# Patient Record
Sex: FEMALE | Race: BLACK OR AFRICAN AMERICAN | Employment: OTHER | URBAN - METROPOLITAN AREA
[De-identification: names, ages, dates, MRNs, and addresses within clinical notes are randomized per-mention and may not be internally consistent; named-entity substitution may affect disease eponyms.]

---

## 2018-12-11 ENCOUNTER — ED HISTORICAL/CONVERTED ENCOUNTER (OUTPATIENT)
Dept: OTHER | Age: 23
End: 2018-12-11

## 2018-12-31 ENCOUNTER — OFFICE VISIT (OUTPATIENT)
Dept: NEUROLOGY | Age: 23
End: 2018-12-31

## 2018-12-31 VITALS
WEIGHT: 101 LBS | DIASTOLIC BLOOD PRESSURE: 70 MMHG | HEART RATE: 85 BPM | SYSTOLIC BLOOD PRESSURE: 100 MMHG | OXYGEN SATURATION: 98 %

## 2018-12-31 DIAGNOSIS — M54.81 BILATERAL OCCIPITAL NEURALGIA: ICD-10-CM

## 2018-12-31 DIAGNOSIS — G43.709 CHRONIC MIGRAINE WITHOUT AURA WITHOUT STATUS MIGRAINOSUS, NOT INTRACTABLE: Primary | ICD-10-CM

## 2018-12-31 RX ORDER — TOPIRAMATE 50 MG/1
50 TABLET, FILM COATED ORAL 2 TIMES DAILY
Qty: 60 TAB | Refills: 1 | Status: SHIPPED | OUTPATIENT
Start: 2018-12-31 | End: 2019-04-11 | Stop reason: SDUPTHER

## 2018-12-31 RX ORDER — TOPIRAMATE 25 MG/1
TABLET ORAL 2 TIMES DAILY
COMMUNITY
End: 2018-12-31 | Stop reason: SDUPTHER

## 2018-12-31 RX ORDER — SUMATRIPTAN 50 MG/1
50 TABLET, FILM COATED ORAL
Qty: 9 TAB | Refills: 0 | Status: SHIPPED | OUTPATIENT
Start: 2018-12-31 | End: 2018-12-31

## 2018-12-31 RX ORDER — ALBUTEROL SULFATE 0.83 MG/ML
SOLUTION RESPIRATORY (INHALATION)
COMMUNITY

## 2018-12-31 RX ORDER — ACETAMINOPHEN 500 MG
TABLET ORAL
Refills: 0 | COMMUNITY
Start: 2018-12-11

## 2018-12-31 NOTE — PATIENT INSTRUCTIONS
Office Policies 
o Phone calls/patient messages: Please allow up to 24 hours for someone in the office to contact you about your call or message. Be mindful your provider may be out of the office or your message may require further review. We encourage you to use shoply for your messages as this is a faster, more efficient way to communicate with our officeo Medication Refills: 
Prescription medications require up to 48 business hours to process. We encourage you to use shoply for your refills. For controlled medications: Please allow up to 72 business hours to process. Certain medications may require you to  a written prescription at our office. NO narcotic/controlled medications will be prescribed after 4pm Monday through Friday or on weekendso Form/Paperwork Compl We ask that you allow 7-14 business days. You may also download your forms to shoply to have your doctor print off.o Test Results: In order for a patient to obtain test results, an appointment must be made with the physician to review the results. Test results cannot be discussed over the phone. A Healthy Lifestyle: Care Instructions Your Care Instructions A healthy lifestyle can help you feel good, stay at a healthy weight, and have plenty of energy for both work and play. A healthy lifestyle is something you can share with your whole family. A healthy lifestyle also can lower your risk for serious health problems, such as high blood pressure, heart disease, and diabetes. You can follow a few steps listed below to improve your health and the health of your family. Follow-up care is a key part of your treatment and safety. Be sure to make and go to all appointments, and call your doctor if you are having problems. It's also a good idea to know your test results and keep a list of the medicines you take. How can you care for yourself at home? · Do not eat too much sugar, fat, or fast foods.  You can still have dessert and treats now and then. The goal is moderation. · Start small to improve your eating habits. Pay attention to portion sizes, drink less juice and soda pop, and eat more fruits and vegetables. ? Eat a healthy amount of food. A 3-ounce serving of meat, for example, is about the size of a deck of cards. Fill the rest of your plate with vegetables and whole grains. ? Limit the amount of soda and sports drinks you have every day. Drink more water when you are thirsty. ? Eat at least 5 servings of fruits and vegetables every day. It may seem like a lot, but it is not hard to reach this goal. A serving or helping is 1 piece of fruit, 1 cup of vegetables, or 2 cups of leafy, raw vegetables. Have an apple or some carrot sticks as an afternoon snack instead of a candy bar. Try to have fruits and/or vegetables at every meal. 
· Make exercise part of your daily routine. You may want to start with simple activities, such as walking, bicycling, or slow swimming. Try to be active 30 to 60 minutes every day. You do not need to do all 30 to 60 minutes all at once. For example, you can exercise 3 times a day for 10 or 20 minutes. Moderate exercise is safe for most people, but it is always a good idea to talk to your doctor before starting an exercise program. 
· Keep moving. [place]rence [place] the lawn, work in the garden, or [place]. Take the stairs instead of the elevator at work. · If you smoke, quit. People who smoke have an increased risk for heart attack, stroke, cancer, and other lung illnesses. Quitting is hard, but there are ways to boost your chance of quitting tobacco for good. ? Use nicotine gum, patches, or lozenges. ? Ask your doctor about stop-smoking programs and medicines. ? Keep trying.  
In addition to reducing your risk of diseases in the future, you will notice some benefits soon after you stop using tobacco. If you have shortness of breath or asthma symptoms, they will likely get better within a few weeks after you quit. · Limit how much alcohol you drink. Moderate amounts of alcohol (up to 2 drinks a day for men, 1 drink a day for women) are okay. But drinking too much can lead to liver problems, high blood pressure, and other health problems. Family health If you have a family, there are many things you can do together to improve your health. · Eat meals together as a family as often as possible. · Eat healthy foods. This includes fruits, vegetables, lean meats and dairy, and whole grains. · Include your family in your fitness plan. Most people think of activities such as jogging or tennis as the way to fitness, but there are many ways you and your family can be more active. Anything that makes you breathe hard and gets your heart pumping is exercise. Here are some tips: 
? Walk to do errands or to take your child to school or the bus. 
? Go for a family bike ride after dinner instead of watching TV. Where can you learn more? Go to http://shirley-crystal.info/. Enter C967 in the search box to learn more about \"A Healthy Lifestyle: Care Instructions. \" Current as of: December 7, 2017 Content Version: 11.8 © 2982-2564 [employer]. Care instructions adapted under license by [employer] (which disclaims liability or warranty for this information). If you have questions about a medical condition or this instruction, always ask your healthcare professional. [place] [address] any warranty or liability for your use of this information. Neck Pain: Care Instructions Your Care Instructions You can have neck pain anywhere from the bottom of your head to the top of your shoulders. It can spread to the upper back or arms. Injuries, painting a ceiling, sleeping with your neck twisted, staying in one position for too long, and many other activities can cause neck pain. Most neck pain gets better with home care.  Your doctor may recommend medicine to relieve pain or relax your muscles. He or she may suggest exercise and physical therapy to increase flexibility and relieve stress. You may need to wear a special (cervical) collar to support your neck for a day or two. Follow-up care is a key part of your treatment and safety. Be sure to make and go to all appointments, and call your doctor if you are having problems. It's also a good idea to know your test results and keep a list of the medicines you take. How can you care for yourself at home? · Try using a heating pad on a low or medium setting for 15 to 20 minutes every 2 or 3 hours. Try a warm shower in place of one session with the heating pad. · You can also try an ice pack for 10 to 15 minutes every 2 to 3 hours. Put a thin cloth between the ice and your skin. · Take pain medicines exactly as directed. ? If the doctor gave you a prescription medicine for pain, take it as prescribed. ? If you are not taking a prescription pain medicine, ask your doctor if you can take an over-the-counter medicine. · If your doctor recommends a cervical collar, wear it exactly as directed. When should you call for help? Call your doctor now or seek immediate medical care if: 
  · You have new or worsening numbness in your arms, buttocks or legs.  
  · You have new or worsening weakness in your arms or legs. (This could make it hard to stand up.)  
  · You lose control of your bladder or bowels.  
 Watch closely for changes in your health, and be sure to contact your doctor if: 
  · Your neck pain is getting worse.  
  · You are not getting better after 1 week.  
  · You do not get better as expected. Where can you learn more? Go to http://shirley-crystal.info/. Enter [id number] in the search box to learn more about \"Neck Pain: Care Instructions. \" Current as of: November 29, 2017 Content Version: 11.8 © 4705-0695 [employer].  Care instructions adapted under license by [address] (which disclaims liability or warranty for this information). If you have questions about a medical condition or this instruction, always ask your healthcare professional. [address] any warranty or liability for your use of this information. Migraine Headache: Care Instructions Your Care Instructions Migraines are painful, throbbing headaches that often start on one side of the head. They may cause nausea and vomiting and make you sensitive to light, sound, or smell. Without treatment, migraines can last from 4 hours to a few days. Medicines can help prevent migraines or stop them after they have started. Your doctor can help you find which ones work best for you. Follow-up care is a key part of your treatment and safety. Be sure to make and go to all appointments, and call your doctor if you are having problems. It's also a good idea to know your test results and keep a list of the medicines you take. How can you care for yourself at home? · Do not drive if you have taken a prescription pain medicine. · Rest in a quiet, dark room until your headache is gone. Close your eyes, and try to relax or go to sleep. Don't watch TV or read. · Put a cold, moist cloth or cold pack on the painful area for 10 to 20 minutes at a time. Put a thin cloth between the cold pack and your skin. · Use a warm, moist towel or a heating pad set on low to relax tight shoulder and neck muscles. · Have someone gently massage your neck and shoulders. · Take your medicines exactly as prescribed. Call your doctor if you think you are having a problem with your medicine. You will get more details on the specific medicines your doctor prescribes. · Be careful not to take pain medicine more often than the instructions allow. You could get worse or more frequent headaches when the medicine wears off. To prevent migraines · Keep a headache diary so you can figure out what triggers your headaches. Avoiding triggers may help you prevent headaches. Record when each headache began, how long it lasted, and what the pain was like. (Was it throbbing, aching, stabbing, or dull?) Write down any other symptoms you had with the headache, such as nausea, flashing lights or dark spots, or sensitivity to bright light or loud noise. Note if the headache occurred near your period. List anything that might have triggered the headache. Triggers may include certain foods (chocolate, cheese, wine) or odors, smoke, bright light, stress, or lack of sleep. · If your doctor has prescribed medicine for your migraines, take it as directed. You may have medicine that you take only when you get a migraine and medicine that you take all the time to help prevent migraines. ? If your doctor has prescribed medicine for when you get a headache, take it at the first sign of a migraine, unless your doctor has given you other instructions. ? If your doctor has prescribed medicine to prevent migraines, take it exactly as prescribed. Call your doctor if you think you are having a problem with your medicine. · Find healthy ways to deal with stress. Migraines are most common during or right after stressful times. Take time to relax before and after you do something that has caused a migraine in the past. 
· Try to keep your muscles relaxed by keeping good posture. Check your jaw, face, neck, and shoulder muscles for tension. Try to relax them. When you sit at a desk, change positions often. And make sure to stretch for 30 seconds each hour. · Get plenty of sleep and exercise. · Eat meals on a regular schedule. Avoid foods and drinks that often trigger migraines.  These include chocolate, alcohol (especially red wine and port), aspartame, monosodium glutamate (MSG), and some additives found in foods (such as hot dogs, sanchez, cold cuts, aged cheeses, and pickled foods). · Limit caffeine. Don't drink too much coffee, tea, or soda. But don't quit caffeine suddenly. That can also give you migraines. · Do not smoke or allow others to smoke around you. If you need help quitting, talk to your doctor about stop-smoking programs and medicines. These can increase your chances of quitting for good. · If you are taking birth control pills or hormone therapy, talk to your doctor about whether they are triggering your migraines. When should you call for help? Call 911 anytime you think you may need emergency care. For example, call if: 
  · You have signs of a stroke. These may include: 
? Sudden numbness, paralysis, or weakness in your face, arm, or leg, especially on only one side of your body. ? Sudden vision changes. ? Sudden trouble speaking. ? Sudden confusion or trouble understanding simple statements. ? Sudden problems with walking or balance. ? A sudden, severe headache that is different from past headaches.  
 Call your doctor now or seek immediate medical care if: 
  · You have new or worse nausea and vomiting.  
  · You have a new or higher fever.  
  · Your headache gets much worse.  
 Watch closely for changes in your health, and be sure to contact your doctor if: 
  · You are not getting better after 2 days (48 hours). Where can you learn more? Go to http://shirley-crystal.info/. Enter Q523 in the search box to learn more about \"Migraine Headache: Care Instructions. \" Current as of: June 4, 2018 Content Version: 11.8 © 8066-6935 Healthwise, Incorporated. Care instructions adapted under license by [employer] (which disclaims liability or warranty for this information). If you have questions about a medical condition or this instruction, always ask your healthcare professional. [address] any warranty or liability for your use of this information.

## 2018-12-31 NOTE — PROGRESS NOTES
NEUROLOGY CLINIC NOTE Patient ID: 
Kizzy Tran [id number] 
65 y.o. 
1995 Date of Consultation:  December 31, 2018 Referring Physician: Kenton Porter NP Reason for Consultation:  headaches Chief Complaint Patient presents with  
 Headache History of Present Illness:  
Patient Active Problem List  
Diagnosis Code  Migraine G43.909 Past Medical History:  
Diagnosis Date  Asthma  FHx: mental illness  Headache  Mental health problem History reviewed. No pertinent surgical history. Prior to Admission medications Medication Sig Start Date End Date Taking? Authorizing Provider  
topiramate (TOPAMAX) 25 mg tablet Take  by mouth two (2) times a day. Yes Provider, Historical  
 
NKDA Social History Tobacco Use  Smoking status: Current Some Day Smoker Packs/day: 1.00  Smokeless tobacco: Never Used Substance Use Topics  Alcohol use: No  
  Frequency: Never  Drug use: No  
 
Family History Problem Relation Age of Onset  Cancer Paternal Uncle  Cancer Maternal Grandmother Subjective:  
  
Kizzy Tran is a 24 y.o. RHAAF with history of migraine headaches, anxiety/depression and asthma who was referred here by Kenton Porter NP for further evaluation of her headaches. Headache since 2014 after pregnancy. Excedrin extra strength and Topiramate (mothers medication) as needed provided relief. Headaches became worse after new birth control (implanon) in 2016 and after starting Mirtazapine for anxiety/depression. Gradual onset. Starts bilateral occiput then radiates to the temples and in between the eyes. 3/10 to >10/10. Pressure and stabbing/tingling pain. Associated with light sensitivity and dizziness. Last 30 minutes to 3 days. Occurs 3 times a week. Worst headaches occur 3 times a month. Last was two weeks PTC (took off work). No known precipitant. Worse with lights. Rest in darkroom helps. Two of Topiramate and Excedrin helps after 30 minutes to manageable level. Currently c/o 3/10 headache. 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 Uses a chair with headrest and wireless headset. Outside reports reviewed: none Review of Systems: A comprehensive review of systems was performed:  
Constitutional: positive for poor appetite Eyes: positive for none Ears, nose, mouth, throat, and face: positive for none Respiratory: positive for none Cardiovascular: positive for none Gastrointestinal: positive for none Genitourinary: positive for none Integument/breast: positive for none Hematologic/lymphatic: positive for none Musculoskeletal: positive for none Neurological: positive for headaches Behavioral/Psych: positive for anxiety, depression Endocrine: positive for none Allergic/Immunologic: positive for none Objective:  
 
Visit Vitals /70 Pulse 85 Wt 101 lb (45.8 kg) SpO2 98% 3/10 headache PHYSICAL EXAM: 
 
General Appearance: Alert, patient appears stated age. General:  Thin,, well nourished,  patient in no apparent distress. Head/Face: The head is normocephalic and atraumatic. Eyes: Conjunctivae appear normal. Sclera appear normal and non-icteric. Nose (and Sinus):   No abnormality of the nose or sinuses is noted. Oral:   Throat is clear. Lymphatics:  No lymphadenopathy in the neck/head. Neck and Thyroid:   No bruits noted in the neck. Respiratory:  Lungs clear to auscultation. Cardiovascular:  Palpation and auscultation: regular rate and rhythm. Extremity: No joint swelling, erythema or pedal edema. NEUROLOGICAL EXAM: 
 
Appearance: The patient is thin, well nourished, provides a coherent history and is in no acute distress. Mental Status: Oriented to time, place and person. Fluent, no aphasia or dysarthria. Mood and affect appropriate. Cranial Nerves:   Intact visual fields. VA 20/25 OD and 20/20 OS on near vision with correction. Fundi are benign. SCOTT, EOM's full, no nystagmus, no ptosis. Facial sensation is normal. Corneal reflexes are intact. Facial movement is symmetric. Hearing is normal bilaterally. Palate is midline with normal elevation. Sternocleidomastoid and trapezius muscles are normal. Tongue is midline. Motor:  5/5 strength in upper and lower proximal and distal muscles. Normal bulk and tone. No pronator drift. Reflexes:   Deep tendon reflexes 2+/4 and symmetrical. Downgoing toes. Sensory:   Normal to cold, pinprick and vibration. Gait:  Normal gait. No Romberg. Can do tandem walking. Tremor:   No tremor noted. Cerebellar:  Intact FTN/JACKELYN/HTS. Neurovascular:  Normal heart sounds and regular rhythm, peripheral pulses intact, and no carotid bruits. Cervical range of motion measurement: 
     Degrees Head flexion   85 Head extension  35 Right lateral head flexion 37 Left lateral head flexion 47 Right head rotation  85 Left head rotation  85 Moderate restriction head extension and mild restriction right lateral head flexion Severe anterior head posture (3-4 FB off shoulder) with shoulders rotated in 
(+) tenderness on palpation bilateral occiput, neck and shoulder muscles Assessment:  
Chronic migraine without aura - worsening Bilateral occipital neuralgia -worsening Plan:  
Neurological examination is nonfocal.  No indication currently to do any neuroimaging. Worse headaches is consistent with migraines. Frequency of headaches warrants maintenance therapy. Increase dose of topiramate up to 50 mg twice daily if necessary. Prescription was provided. Trial of sumatriptan 50 mg as needed for abortive therapy. Prescription was provided. Advised to refrain from things that could trigger a headache (dairy, chocolate, wine and etc.). Exam reveals significant elements of bilateral occipital neuralgia causing cervicogenic headaches secondary to poor head posture. Restrictions to head extension and right lateral flexion confirmed by range of motion measurements. Advised to correct her anterior head posture. Use headrest at work, at home and while driving. Use wireless headset. Do not carry anything on the shoulder. Only one pillow when sleeping. Given brochure for neck and shoulder exercises to do. If not successful then I will send her to physical therapy. Potential future trial of muscle relaxants anti-spasticity medications. Okay to take OTC medication for abortive therapy. Potential future occipital nerve blocks if condition worsens. All questions and concerns were answered.

## 2019-01-03 PROBLEM — G43.909 MIGRAINE: Chronic | Status: ACTIVE | Noted: 2019-01-03

## 2019-04-11 ENCOUNTER — OFFICE VISIT (OUTPATIENT)
Dept: NEUROLOGY | Age: 24
End: 2019-04-11

## 2019-04-11 VITALS
HEIGHT: 66 IN | WEIGHT: 97.6 LBS | SYSTOLIC BLOOD PRESSURE: 110 MMHG | OXYGEN SATURATION: 98 % | HEART RATE: 70 BPM | BODY MASS INDEX: 15.69 KG/M2 | DIASTOLIC BLOOD PRESSURE: 58 MMHG

## 2019-04-11 DIAGNOSIS — M54.81 BILATERAL OCCIPITAL NEURALGIA: ICD-10-CM

## 2019-04-11 DIAGNOSIS — G43.709 CHRONIC MIGRAINE WITHOUT AURA WITHOUT STATUS MIGRAINOSUS, NOT INTRACTABLE: Primary | ICD-10-CM

## 2019-04-11 RX ORDER — IBUPROFEN 200 MG
TABLET ORAL
COMMUNITY
End: 2021-01-27

## 2019-04-11 RX ORDER — TOPIRAMATE 50 MG/1
50 TABLET, FILM COATED ORAL 2 TIMES DAILY
Qty: 60 TAB | Refills: 2 | Status: SHIPPED | OUTPATIENT
Start: 2019-04-11 | End: 2021-01-27

## 2019-04-11 RX ORDER — SUMATRIPTAN 100 MG/1
100 TABLET, FILM COATED ORAL
Qty: 9 TAB | Refills: 3 | Status: SHIPPED | OUTPATIENT
Start: 2019-04-11 | End: 2019-04-11

## 2019-04-11 RX ORDER — SUMATRIPTAN 50 MG/1
50 TABLET, FILM COATED ORAL
COMMUNITY
End: 2019-04-11 | Stop reason: DRUGHIGH

## 2019-04-11 NOTE — LETTER
4/11/19 Patient: Anuradha Jackson YOB: 1995 Date of Visit: 4/11/2019 Liz Sanz NP 
[facility] [address] [facility] [address] VIA Facsimile: [phone] Dear Liz Sanz NP, Thank you for referring Ms. Anuradha Jackson to [address] [place] [address] for evaluation. My notes for this consultation are attached. If you have questions, please do not hesitate to call me. I look forward to following your patient along with you. Sincerely, Asia Johnson MD

## 2019-04-11 NOTE — PROGRESS NOTES
NEUROLOGY CLINIC NOTE    Patient ID:  Miguelito Jack  <[id number]>  22 y.o.  1995    Date of Visit:  April 11, 2019    Referring Physician: Greta Chavez NP    Reason for Visit:  headaches  Chief Complaint   Patient presents with    Follow-up     migraine       History of Present Illness:   Patient Active Problem List   Diagnosis Code    Migraine G43.909     Past Medical History:   Diagnosis Date    Asthma     FHx: mental illness     Headache     Mental health problem      No past surgical history on file. Prior to Admission medications    Medication Sig Start Date End Date Taking? Authorizing Provider   SUMAtriptan (IMITREX) 50 mg tablet Take 50 mg by mouth once as needed for Migraine. Yes Provider, Historical   ibuprofen (ADVIL) 200 mg tablet Take  by mouth. Yes Provider, Historical   aspirin-acetaminophen-caffeine (EXCEDRIN ES) 250-250-65 mg per tablet Take 1 Tab by mouth. Yes Provider, Historical   acetaminophen (TYLENOL) 500 mg tablet take 1 tablet by mouth every 4 hours if needed for pain 12/11/18  Yes Provider, Historical   albuterol (PROVENTIL VENTOLIN) 2.5 mg /3 mL (0.083 %) nebulizer solution albuterol sulfate 2.5 mg/3 mL (0.083 %) solution for nebulization   Yes Provider, Historical   topiramate (TOPAMAX) 50 mg tablet Take 1 Tab by mouth two (2) times a day. Take 1/2 in am and 1 in pm x 1 wk; then 1 BID 12/31/18  Yes Kike Rowan MD     NKDA    Social History     Tobacco Use    Smoking status: Current Some Day Smoker     Packs/day: 1.00    Smokeless tobacco: Never Used   Substance Use Topics    Alcohol use: No     Frequency: Never    Drug use: No     Family History   Problem Relation Age of Onset    Cancer Paternal Uncle     Cancer Maternal Grandmother        Subjective:      Miguelito Jack is a 21 y.o. RHAAF with history of migraine headaches, anxiety/depression and asthma who was referred here by Greta Chavez NP for further evaluation of her headaches. Headache since 2014 after pregnancy. Excedrin extra strength and Topiramate (mothers medication) as needed provided relief. Headaches became worse after new birth control (implanon) in 2016 and after starting Mirtazapine for anxiety/depression. Gradual onset. Starts bilateral occiput then radiates to the temples and in between the eyes. 3/10 to >10/10. Pressure and stabbing/tingling pain. Associated with light sensitivity and dizziness. Last 30 minutes to 3 days. Occurs 3 times a week. Worst headaches occur 3 times a month. Last was two weeks PTC (took off work). No known precipitant. Worse with lights. Rest in darkroom helps. Two of Topiramate and Excedrin helps after 30 minutes to manageable level. Currently c/o 3/10 headache. 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 Uses a chair with headrest and wireless headset. Since the last visit on 12/31/2018, per patient she was doing very well from a headache standpoint until 3/19/2019. She was only having at most 1 migraine headache per week. Sumatriptan 50 mg resolves that headache after 30 minutes. She is taking Topiramate 50 mg BID. She even had a 2 week period without any headaches at all. Then last 3/19/2019 she started the night shift at work and her headache are worst in frequency. Her shift starts at 2:30 pm to midnight. Then she has problems starting sleep. She sleep around past 2 am and wakes up around 7:30 am to get her kids ready for school. Averages 5 1/2 hours of sleep. Wakes up not feeling rested. She saw her PCP recently and was prescribed Mirtazapine which she will start tonight. Headaches almost daily. Alternates between Advil, Excedrin migraine and Sumatriptan. Currently 9/10 headache. She is aware of her posture and tries to correct it. She is doing the neck and shoulder exercises.     Outside reports reviewed: none    Review of Systems:    A comprehensive review of systems was performed:   Positive for weight loss, chills, insomnia, night sweats, blurry vision, dry eyes, joint pain, myalgia, leg cramps, weakness, back pain, anxiety, depression, headaches      Objective:     Visit Vitals  /58   Pulse 70   Ht 5' 6\" (1.676 m)   Wt 97 lb 9.6 oz (44.3 kg)   SpO2 98%   BMI 15.75 kg/m²     9/10 headache    PHYSICAL EXAM:    NEUROLOGICAL EXAM:    Appearance: The patient is thin, well nourished, provides a coherent history and is in no acute distress. Mental Status: Oriented to time, place and person. Fluent, no aphasia or dysarthria. Mood and affect appropriate. Cranial Nerves:   II - XII were intact. Motor:  5/5 strength. Normal bulk and tone. No pronator drift. Reflexes:   Deep tendon reflexes 2+/4 and symmetrical. Downgoing toes. Sensory:   Normal to cold, pinprick and vibration. Gait:  Normal gait. No Romberg. Tremor:   No tremor noted. Cerebellar:  Intact FTN/JACKELYN/HTS. Severe anterior head posture (3-4 FB off shoulder) with shoulders rotated in  (+) tenderness on palpation bilateral occiput, neck and shoulder muscles      Assessment:   Chronic migraine without aura - worsening  Bilateral occipital neuralgia -worsening  Insomnia    Plan:   Neurological examination is unchanged. It is still nonfocal.  No indication currently to do any neuroimaging. Worse headaches is consistent with migraines. Continue Topiramate 50 mg twice daily. Prescription was provided. Increase Sumatriptan to 100 mg as needed for abortive therapy. Prescription was provided. Advised to refrain from things that could trigger a headache (dairy, chocolate, wine and etc.). Advised to obtain FMLA form. Exam reveals significant elements of bilateral occipital neuralgia causing cervicogenic headaches secondary to poor head posture. Encouraged to continue to correct her anterior head posture. Use headrest at work, at home and while driving. Use wireless headset.  Do not carry anything on the shoulder. Only one pillow when sleeping. Continue neck and shoulder exercises to do. If not successful then I will send her to physical therapy. Potential future trial of muscle relaxants anti-spasticity medications. Okay to take OTC medication for abortive therapy. Potential future occipital nerve blocks if condition worsens. Issue with starting sleep. Agree with trial of Mirtazapine. If no benefit, then I may do a trial of Amitriptyline. All questions and concerns were answered.

## 2019-04-11 NOTE — PATIENT INSTRUCTIONS
Migraine Headache: Care Instructions  Your Care Instructions  Migraines are painful, throbbing headaches that often start on one side of the head. They may cause nausea and vomiting and make you sensitive to light, sound, or smell. Without treatment, migraines can last from 4 hours to a few days. Medicines can help prevent migraines or stop them after they have started. Your doctor can help you find which ones work best for you. Follow-up care is a key part of your treatment and safety. Be sure to make and go to all appointments, and call your doctor if you are having problems. It's also a good idea to know your test results and keep a list of the medicines you take. How can you care for yourself at home? · Do not drive if you have taken a prescription pain medicine. · Rest in a quiet, dark room until your headache is gone. Close your eyes, and try to relax or go to sleep. Don't watch TV or read. · Put a cold, moist cloth or cold pack on the painful area for 10 to 20 minutes at a time. Put a thin cloth between the cold pack and your skin. · Use a warm, moist towel or a heating pad set on low to relax tight shoulder and neck muscles. · Have someone gently massage your neck and shoulders. · Take your medicines exactly as prescribed. Call your doctor if you think you are having a problem with your medicine. You will get more details on the specific medicines your doctor prescribes. · Be careful not to take pain medicine more often than the instructions allow. You could get worse or more frequent headaches when the medicine wears off. To prevent migraines  · Keep a headache diary so you can figure out what triggers your headaches. Avoiding triggers may help you prevent headaches. Record when each headache began, how long it lasted, and what the pain was like.  (Was it throbbing, aching, stabbing, or dull?) Write down any other symptoms you had with the headache, such as nausea, flashing lights or dark spots, or sensitivity to bright light or loud noise. Note if the headache occurred near your period. List anything that might have triggered the headache. Triggers may include certain foods (chocolate, cheese, wine) or odors, smoke, bright light, stress, or lack of sleep. · If your doctor has prescribed medicine for your migraines, take it as directed. You may have medicine that you take only when you get a migraine and medicine that you take all the time to help prevent migraines. ? If your doctor has prescribed medicine for when you get a headache, take it at the first sign of a migraine, unless your doctor has given you other instructions. ? If your doctor has prescribed medicine to prevent migraines, take it exactly as prescribed. Call your doctor if you think you are having a problem with your medicine. · Find healthy ways to deal with stress. Migraines are most common during or right after stressful times. Take time to relax before and after you do something that has caused a migraine in the past.  · Try to keep your muscles relaxed by keeping good posture. Check your jaw, face, neck, and shoulder muscles for tension. Try to relax them. When you sit at a desk, change positions often. And make sure to stretch for 30 seconds each hour. · Get plenty of sleep and exercise. · Eat meals on a regular schedule. Avoid foods and drinks that often trigger migraines. These include chocolate, alcohol (especially red wine and port), aspartame, monosodium glutamate (MSG), and some additives found in foods (such as hot dogs, sanchez, cold cuts, aged cheeses, and pickled foods). · Limit caffeine. Don't drink too much coffee, tea, or soda. But don't quit caffeine suddenly. That can also give you migraines. · Do not smoke or allow others to smoke around you. If you need help quitting, talk to your doctor about stop-smoking programs and medicines. These can increase your chances of quitting for good.   · If you are taking birth control pills or hormone therapy, talk to your doctor about whether they are triggering your migraines. When should you call for help? Call 911 anytime you think you may need emergency care. For example, call if:    · You have signs of a stroke. These may include:  ? Sudden numbness, paralysis, or weakness in your face, arm, or leg, especially on only one side of your body. ? Sudden vision changes. ? Sudden trouble speaking. ? Sudden confusion or trouble understanding simple statements. ? Sudden problems with walking or balance. ? A sudden, severe headache that is different from past headaches.    Call your doctor now or seek immediate medical care if:    · You have new or worse nausea and vomiting.     · You have a new or higher fever.     · Your headache gets much worse.    Watch closely for changes in your health, and be sure to contact your doctor if:    · You are not getting better after 2 days (48 hours). Where can you learn more? Go to http://shirley-crystal.info/. Enter Q282 in the search box to learn more about \"Migraine Headache: Care Instructions. \"  Current as of: Terrie 3, 2018  Content Version: 11.9  © 2600-2853 [employer]. Care instructions adapted under license by [employer] (which disclaims liability or warranty for this information). If you have questions about a medical condition or this instruction, always ask your healthcare professional. [address] any warranty or liability for your use of this information. Neck: Exercises  Your Care Instructions  Here are some examples of typical rehabilitation exercises for your condition. Start each exercise slowly. Ease off the exercise if you start to have pain. Your doctor or physical therapist will tell you when you can start these exercises and which ones will work best for you. How to do the exercises  Neck stretch    1.  This stretch works best if you keep your shoulder down as you lean away from it. To help you remember to do this, start by relaxing your shoulders and lightly holding on to your thighs or your chair. 2. Tilt your head toward your shoulder and hold for 15 to 30 seconds. Let the weight of your head stretch your muscles. 3. If you would like a little added stretch, use your hand to gently and steadily pull your head toward your shoulder. For example, keeping your right shoulder down, lean your head to the left. 4. Repeat 2 to 4 times toward each shoulder. Diagonal neck stretch    1. Turn your head slightly toward the direction you will be stretching, and tilt your head diagonally toward your chest and hold for 15 to 30 seconds. 2. If you would like a little added stretch, use your hand to gently and steadily pull your head forward on the diagonal.  3. Repeat 2 to 4 times toward each side. Dorsal glide stretch    1. Sit or stand tall and look straight ahead. 2. Slowly tuck your chin as you glide your head backward over your body  3. Hold for a count of 6, and then relax for up to 10 seconds. 4. Repeat 8 to 12 times. Chest and shoulder stretch    1. Sit or stand tall and glide your head backward as in the dorsal glide stretch. 2. Raise both arms so that your hands are next to your ears. 3. Take a deep breath, and as you breathe out, lower your elbows down and behind your back. You will feel your shoulder blades slide down and together, and at the same time you will feel a stretch across your chest and the front of your shoulders. 4. Hold for about 6 seconds, and then relax for up to 10 seconds. 5. Repeat 8 to 12 times. Strengthening: Hands on head    1. Move your head backward, forward, and side to side against gentle pressure from your hands, holding each position for about 6 seconds. 2. Repeat 8 to 12 times. Follow-up care is a key part of your treatment and safety.  Be sure to make and go to all appointments, and call your doctor if you are having problems. It's also a good idea to know your test results and keep a list of the medicines you take. Where can you learn more? Go to http://shirley-crystal.info/. Enter P975 in the search box to learn more about \"Neck: Exercises. \"  Current as of: September 20, 2018  Content Version: 11.9  © 2679-5246 [employer]. Care instructions adapted under license by [employer] (which disclaims liability or warranty for this information). If you have questions about a medical condition or this instruction, always ask your healthcare professional. [place] [address] any warranty or liability for your use of this information. Neck Pain: Care Instructions  Your Care Instructions    You can have neck pain anywhere from the bottom of your head to the top of your shoulders. It can spread to the upper back or arms. Injuries, painting a ceiling, sleeping with your neck twisted, staying in one position for too long, and many other activities can cause neck pain. Most neck pain gets better with home care. Your doctor may recommend medicine to relieve pain or relax your muscles. He or she may suggest exercise and physical therapy to increase flexibility and relieve stress. You may need to wear a special (cervical) collar to support your neck for a day or two. Follow-up care is a key part of your treatment and safety. Be sure to make and go to all appointments, and call your doctor if you are having problems. It's also a good idea to know your test results and keep a list of the medicines you take. How can you care for yourself at home? · Try using a heating pad on a low or medium setting for 15 to 20 minutes every 2 or 3 hours. Try a warm shower in place of one session with the heating pad. · You can also try an ice pack for 10 to 15 minutes every 2 to 3 hours. Put a thin cloth between the ice and your skin.   · Take pain medicines exactly as directed. ? If the doctor gave you a prescription medicine for pain, take it as prescribed. ? If you are not taking a prescription pain medicine, ask your doctor if you can take an over-the-counter medicine. · If your doctor recommends a cervical collar, wear it exactly as directed. When should you call for help? Call your doctor now or seek immediate medical care if:    · You have new or worsening numbness in your arms, buttocks or legs.     · You have new or worsening weakness in your arms or legs. (This could make it hard to stand up.)     · You lose control of your bladder or bowels.    Watch closely for changes in your health, and be sure to contact your doctor if:    · Your neck pain is getting worse.     · You are not getting better after 1 week.     · You do not get better as expected. Where can you learn more? Go to http://shirley-crystal.info/. Enter [id number] in the search box to learn more about \"Neck Pain: Care Instructions. \"  Current as of: September 20, 2018  Content Version: 11.9  © 7591-3359 [employer]. Care instructions adapted under license by [employer] (which disclaims liability or warranty for this information). If you have questions about a medical condition or this instruction, always ask your healthcare professional. [address] any warranty or liability for your use of this information.

## 2019-04-12 ENCOUNTER — TELEPHONE (OUTPATIENT)
Dept: NEUROLOGY | Age: 24
End: 2019-04-12

## 2019-04-12 NOTE — TELEPHONE ENCOUNTER
Patient faxed over her FMLA and ADA forms and would like to spk with the nurse regding them.  She would like to send them to her employer as soon as possible

## 2019-04-15 ENCOUNTER — TELEPHONE (OUTPATIENT)
Dept: NEUROLOGY | Age: 24
End: 2019-04-15

## 2019-04-15 NOTE — TELEPHONE ENCOUNTER
----- Message from Semaj Rodney sent at 4/15/2019  1:07 PM EDT -----  Regarding: Dr. Elena Pals: [phone]  Pt requesting a call back from [address]. Please fax document back to [phone] to pt's mom. Stated need the documents back within 8 days.

## 2019-04-15 NOTE — TELEPHONE ENCOUNTER
Left message for patient stating I had received the paper work and I would let her know once I had an update on that status

## 2019-04-15 NOTE — TELEPHONE ENCOUNTER
Spoke with patient and she stated that the packets just needed to be filled out as soon as possible because her job needs them back. She said to fax it to her mother and her mother will give them to her .

## 2019-04-18 ENCOUNTER — TELEPHONE (OUTPATIENT)
Dept: NEUROLOGY | Age: 24
End: 2019-04-18

## 2019-04-18 NOTE — TELEPHONE ENCOUNTER
----- Message from Eloina Milan sent at 4/18/2019 12:18 PM EDT -----  Regarding: Dr Chiqui Saucedo telephone  The pt is requesting a callback to find out if her FMLA and ADA forms have been filled out.         Best contact number is [phone]

## 2019-04-19 NOTE — TELEPHONE ENCOUNTER
Spoke with patient and let her know that I faxed her FMLA paperwork over to her mother at the number she provided. I also let her know that we would just hold onto the other packet if she needs.      She stated understanding

## 2019-06-11 ENCOUNTER — ED HISTORICAL/CONVERTED ENCOUNTER (OUTPATIENT)
Dept: OTHER | Age: 24
End: 2019-06-11

## 2019-06-12 ENCOUNTER — TELEPHONE (OUTPATIENT)
Dept: NEUROLOGY | Age: 24
End: 2019-06-12

## 2019-06-18 ENCOUNTER — ED HISTORICAL/CONVERTED ENCOUNTER (OUTPATIENT)
Dept: OTHER | Age: 24
End: 2019-06-18

## 2019-08-12 ENCOUNTER — ED HISTORICAL/CONVERTED ENCOUNTER (OUTPATIENT)
Dept: OTHER | Age: 24
End: 2019-08-12

## 2019-12-29 ENCOUNTER — ED HISTORICAL/CONVERTED ENCOUNTER (OUTPATIENT)
Dept: OTHER | Age: 24
End: 2019-12-29

## 2020-03-24 ENCOUNTER — ED HISTORICAL/CONVERTED ENCOUNTER (OUTPATIENT)
Dept: OTHER | Age: 25
End: 2020-03-24

## 2020-04-03 ENCOUNTER — TELEPHONE (OUTPATIENT)
Dept: NEUROLOGY | Age: 25
End: 2020-04-03

## 2021-01-27 ENCOUNTER — HOSPITAL ENCOUNTER (EMERGENCY)
Age: 26
Discharge: HOME | End: 2021-01-27
Attending: FAMILY MEDICINE
Payer: MEDICAID

## 2021-01-27 VITALS
RESPIRATION RATE: 16 BRPM | DIASTOLIC BLOOD PRESSURE: 67 MMHG | BODY MASS INDEX: 24.33 KG/M2 | SYSTOLIC BLOOD PRESSURE: 120 MMHG | TEMPERATURE: 98.2 F | HEIGHT: 67 IN | OXYGEN SATURATION: 100 % | HEART RATE: 87 BPM | WEIGHT: 155 LBS

## 2021-01-27 DIAGNOSIS — N30.01 ACUTE CYSTITIS WITH HEMATURIA: Primary | ICD-10-CM

## 2021-01-27 LAB
APPEARANCE UR: CLEAR
BACTERIA URNS QL MICRO: ABNORMAL /HPF
BILIRUB UR QL: ABNORMAL
COLOR UR: YELLOW
EPITH CASTS URNS QL MICRO: ABNORMAL /LPF
GLUCOSE UR STRIP.AUTO-MCNC: NEGATIVE MG/DL
HCG UR QL: NEGATIVE
HGB UR QL STRIP: ABNORMAL
KETONES UR QL STRIP.AUTO: NEGATIVE MG/DL
LEUKOCYTE ESTERASE UR QL STRIP.AUTO: ABNORMAL
NITRITE UR QL STRIP.AUTO: NEGATIVE
PH UR STRIP: 6 [PH] (ref 5–8)
PROT UR STRIP-MCNC: ABNORMAL MG/DL
RBC #/AREA URNS HPF: ABNORMAL /HPF (ref 0–5)
SP GR UR REFRACTOMETRY: 1.02 (ref 1–1.03)
UA: UC IF INDICATED,UAUC: ABNORMAL
UROBILINOGEN UR QL STRIP.AUTO: 4 EU/DL (ref 0.2–1)
WBC URNS QL MICRO: ABNORMAL /HPF (ref 0–4)

## 2021-01-27 PROCEDURE — 81001 URINALYSIS AUTO W/SCOPE: CPT

## 2021-01-27 PROCEDURE — 99282 EMERGENCY DEPT VISIT SF MDM: CPT

## 2021-01-27 PROCEDURE — 81025 URINE PREGNANCY TEST: CPT

## 2021-01-27 RX ORDER — CEPHALEXIN 500 MG/1
500 CAPSULE ORAL 2 TIMES DAILY
Qty: 10 CAP | Refills: 0 | Status: SHIPPED | OUTPATIENT
Start: 2021-01-27 | End: 2021-02-01

## 2021-01-27 NOTE — ED PROVIDER NOTES
EMERGENCY DEPARTMENT HISTORY AND PHYSICAL EXAM      Date: 1/27/2021  Patient Name: Stewart Ya    History of Presenting Illness     Chief Complaint   Patient presents with    Pelvic Pain    Back Pain       History Provided By:     HPI: Stewart Ya, is an extremely pleasant 31 y.o. female presenting to the ED with cc of suprapubic pain. She states she has had this for approximately 4 days. Pain is waxing and waning. Seems to be getting worse. She states she is also had the sensation that she needs to pee frequently however there is not very much urine. After she urinates she states she has mild pain. She denies any flank pain. She denies any abnormal vaginal discharge. She has not had any fevers, chills nor rigors. No nausea vomiting or diarrhea. No weakness dizziness nor confusion. She states she is not pregnant. She denies other concerns. There are no other complaints, changes, or physical findings at this time. PCP: Stacia Key NP    No current facility-administered medications on file prior to encounter. Current Outpatient Medications on File Prior to Encounter   Medication Sig Dispense Refill    [DISCONTINUED] ibuprofen (ADVIL) 200 mg tablet Take  by mouth.  [DISCONTINUED] aspirin-acetaminophen-caffeine (EXCEDRIN ES) 250-250-65 mg per tablet Take 1 Tab by mouth.  [DISCONTINUED] topiramate (TOPAMAX) 50 mg tablet Take 1 Tab by mouth two (2) times a day. 60 Tab 2    acetaminophen (TYLENOL) 500 mg tablet take 1 tablet by mouth every 4 hours if needed for pain  0    albuterol (PROVENTIL VENTOLIN) 2.5 mg /3 mL (0.083 %) nebulizer solution albuterol sulfate 2.5 mg/3 mL (0.083 %) solution for nebulization         Past History     Past Medical History:  Past Medical History:   Diagnosis Date    Asthma     FHx: mental illness     Headache     Mental health problem        Past Surgical History:  No past surgical history on file.     Family History:  Family History Problem Relation Age of Onset    Cancer Paternal Uncle     Cancer Maternal Grandmother        Social History:  Social History     Tobacco Use    Smoking status: Current Some Day Smoker     Packs/day: 1.00    Smokeless tobacco: Never Used   Substance Use Topics    Alcohol use: No     Frequency: Never    Drug use: No       Allergies:  No Known Allergies      Review of Systems     Review of Systems   Constitutional: Negative for activity change, appetite change, chills, fatigue and fever. HENT: Negative for congestion and sore throat. Eyes: Negative for photophobia and visual disturbance. Respiratory: Negative for cough, shortness of breath and wheezing. Cardiovascular: Negative for chest pain, palpitations and leg swelling. Gastrointestinal: Negative for abdominal pain, diarrhea, nausea and vomiting. Endocrine: Negative for cold intolerance and heat intolerance. Genitourinary: Positive for dysuria, frequency and pelvic pain. Negative for flank pain and hematuria. Musculoskeletal: Negative for gait problem and joint swelling. Skin: Negative for color change and rash. Neurological: Negative for dizziness and headaches. Physical Exam     Physical Exam  Constitutional:       Appearance: She is well-developed. HENT:      Head: Normocephalic and atraumatic. Mouth/Throat:      Mouth: Mucous membranes are moist.      Pharynx: Oropharynx is clear. Eyes:      Conjunctiva/sclera: Conjunctivae normal.      Pupils: Pupils are equal, round, and reactive to light. Neck:      Musculoskeletal: Normal range of motion and neck supple. Cardiovascular:      Rate and Rhythm: Normal rate and regular rhythm. Heart sounds: No murmur. Pulmonary:      Effort: No respiratory distress. Breath sounds: No stridor. No wheezing, rhonchi or rales. Abdominal:      General: There is no distension. Tenderness: There is no abdominal tenderness. There is no rebound.    Skin:     General: Skin is warm and dry. Neurological:      General: No focal deficit present. Mental Status: She is alert and oriented to person, place, and time. Psychiatric:         Mood and Affect: Mood normal.         Behavior: Behavior normal.         Lab and Diagnostic Study Results     Labs -     Recent Results (from the past 12 hour(s))   URINALYSIS W/ REFLEX CULTURE    Collection Time: 01/27/21 10:30 AM    Specimen: Urine   Result Value Ref Range    Color Yellow      Appearance Clear Clear      Specific gravity 1.020 1.003 - 1.030      pH (UA) 6.0 5.0 - 8.0      Protein Trace (A) Negative mg/dL    Glucose Negative Negative mg/dL    Ketone Negative Negative mg/dL    Bilirubin Small (A) Negative      Blood Large (A) Negative      Urobilinogen 4.0 (H) 0.2 - 1.0 EU/dL    Nitrites Negative Negative      Leukocyte Esterase Small (A) Negative      WBC 5-10 0 - 4 /hpf    RBC 5-10 0 - 5 /hpf    Epithelial cells Few Few /lpf    Bacteria 2+ (A) Negative /hpf    UA:UC IF INDICATED Culture not indicated by UA result Culture not indicated by UA result     HCG URINE, QL    Collection Time: 01/27/21 10:30 AM   Result Value Ref Range    HCG urine, QL Negative Negative         Radiologic Studies -   @lastxrresult@  CT Results  (Last 48 hours)    None        CXR Results  (Last 48 hours)    None            Medical Decision Making   - I am the first provider for this patient. - I reviewed the vital signs, available nursing notes, past medical history, past surgical history, family history and social history. - Initial assessment performed. The patients presenting problems have been discussed, and they are in agreement with the care plan formulated and outlined with them. I have encouraged them to ask questions as they arise throughout their visit. Vital Signs-Reviewed the patient's vital signs.   Patient Vitals for the past 12 hrs:   Temp Pulse Resp BP SpO2   01/27/21 1013 98.2 °F (36.8 °C) 87 16 120/67 100 %         ED Course/ Provider Notes (Medical Decision Making):     Patient presented to the emergency with a chief complaint of pelvic pain and dysuria. Urinalysis positive for bacteria, blood, leukocyte esterase, white blood cells. Patient started on a course of Keflex. Dulce Montez was given a thorough list of signs and symptoms that would warrant an immediate return to the emergency department. Otherwise Dulce Montez will follow up with PCP. Procedures   Medical Decision Makingedical Decision Making  Performed by: Alvarez Huerta DO  Procedures  None       Disposition   Disposition:     All of the diagnostic tests were reviewed and questions answered. Diagnosis, care plan and treatment options were discussed. The patient understands the instructions and will follow up as directed. The patients results have been reviewed with them. They have been counseled regarding their diagnosis. The patient verbally convey understanding and agreement of the signs, symptoms, diagnosis, treatment and prognosis and additionally agrees to follow up as recommended with their PCP in 24 - 48 hours. They also agree with the care-plan and convey that all of their questions have been answered. I have also put together some discharge instructions for them that include: 1) educational information regarding their diagnosis, 2) how to care for their diagnosis at home, as well a 3) list of reasons why they would want to return to the ED prior to their follow-up appointment, should their condition change. Home     DISCHARGE PLAN:    1.    Current Discharge Medication List      CONTINUE these medications which have NOT CHANGED    Details   acetaminophen (TYLENOL) 500 mg tablet take 1 tablet by mouth every 4 hours if needed for pain  Refills: 0      albuterol (PROVENTIL VENTOLIN) 2.5 mg /3 mL (0.083 %) nebulizer solution albuterol sulfate 2.5 mg/3 mL (0.083 %) solution for nebulization               2.   Follow-up Information Follow up With Specialties Details Why Contact Info    Jorge L Vasquez NP Nurse Practitioner Schedule an appointment as soon as possible for a visit   [address]  [facility] [address]              3.  Return to ED if worse       4. Current Discharge Medication List      START taking these medications    Details   cephALEXin (Keflex) 500 mg capsule Take 1 Cap by mouth two (2) times a day for 5 days. Qty: 10 Cap, Refills: 0               Diagnosis     Clinical Impression:    1. Acute cystitis with hematuria        Attestations:    Ayala Witt, DO    Please note that this dictation was completed with [employer], the computer voice recognition software. Quite often unanticipated grammatical, syntax, homophones, and other interpretive errors are inadvertently transcribed by the computer software. Please disregard these errors. Please excuse any errors that have escaped final proofreading. Thank you.

## 2021-01-27 NOTE — DISCHARGE INSTRUCTIONS
Thank you! Thank you for allowing me to care for you in the emergency department. I sincerely hope that you are satisfied with your visit today. It is my goal to provide you with excellent care. Below you will find a list of your labs and imaging from your visit today. Should you have any questions regarding these results please do not hesitate to call the emergency department. Labs -     Recent Results (from the past 12 hour(s))   URINALYSIS W/ REFLEX CULTURE    Collection Time: 01/27/21 10:30 AM    Specimen: Urine   Result Value Ref Range    Color Yellow      Appearance Clear Clear      Specific gravity 1.020 1.003 - 1.030      pH (UA) 6.0 5.0 - 8.0      Protein Trace (A) Negative mg/dL    Glucose Negative Negative mg/dL    Ketone Negative Negative mg/dL    Bilirubin Small (A) Negative      Blood Large (A) Negative      Urobilinogen 4.0 (H) 0.2 - 1.0 EU/dL    Nitrites Negative Negative      Leukocyte Esterase Small (A) Negative      WBC 5-10 0 - 4 /hpf    RBC 5-10 0 - 5 /hpf    Epithelial cells Few Few /lpf    Bacteria 2+ (A) Negative /hpf    UA:UC IF INDICATED Culture not indicated by UA result Culture not indicated by UA result     HCG URINE, QL    Collection Time: 01/27/21 10:30 AM   Result Value Ref Range    HCG urine, QL Negative Negative         Radiologic Studies -   No orders to display     CT Results  (Last 48 hours)      None          CXR Results  (Last 48 hours)      None               If you feel that you have not received excellent quality care or timely care, please ask to speak to the nurse manager. Please choose us in the future for your continued health care needs. ------------------------------------------------------------------------------------------------------------  The exam and treatment you received in the Emergency Department were for an urgent problem and are not intended as complete care.  It is important that you follow-up with a doctor, nurse practitioner, or physician assistant to:  (1) confirm your diagnosis,  (2) re-evaluation of changes in your illness and treatment, and  (3) for ongoing care. If your symptoms become worse or you do not improve as expected and you are unable to reach your usual health care provider, you should return to the Emergency Department. We are available 24 hours a day. Please take your discharge instructions with you when you go to your follow-up appointment. If you have any problem arranging a follow-up appointment, contact the Emergency Department immediately. If a prescription has been provided, please have it filled as soon as possible to prevent a delay in treatment. Read the entire medication instruction sheet provided to you by the pharmacy. If you have any questions or reservations about taking the medication due to side effects or interactions with other medications, please call your primary care physician or contact the ER to speak with the charge nurse. Make an appointment with your family doctor or the physician you were referred to for follow-up of this visit as instructed on your discharge paperwork, as this is a mandatory follow-up. Return to the ER if you are unable to be seen or if you are unable to be seen in a timely manner. If you have any problem arranging the follow-up visit, contact the Emergency Department immediately.

## 2022-03-18 PROBLEM — G43.909 MIGRAINE: Status: ACTIVE | Noted: 2019-01-03

## 2022-06-23 ENCOUNTER — HOSPITAL ENCOUNTER (EMERGENCY)
Age: 27
Discharge: HOME | End: 2022-06-23
Payer: MEDICAID

## 2022-06-23 VITALS
HEIGHT: 67 IN | WEIGHT: 165 LBS | OXYGEN SATURATION: 99 % | DIASTOLIC BLOOD PRESSURE: 76 MMHG | TEMPERATURE: 98.2 F | BODY MASS INDEX: 25.9 KG/M2 | HEART RATE: 61 BPM | SYSTOLIC BLOOD PRESSURE: 114 MMHG | RESPIRATION RATE: 16 BRPM

## 2022-06-23 DIAGNOSIS — K08.89 PAIN, DENTAL: Primary | ICD-10-CM

## 2022-06-23 DIAGNOSIS — H61.21 IMPACTED CERUMEN OF RIGHT EAR: ICD-10-CM

## 2022-06-23 DIAGNOSIS — R22.0 GINGIVAL SWELLING: ICD-10-CM

## 2022-06-23 PROCEDURE — 99283 EMERGENCY DEPT VISIT LOW MDM: CPT

## 2022-06-23 RX ORDER — AMOXICILLIN AND CLAVULANATE POTASSIUM 875; 125 MG/1; MG/1
1 TABLET, FILM COATED ORAL 2 TIMES DAILY
Qty: 20 TABLET | Refills: 0 | Status: SHIPPED | OUTPATIENT
Start: 2022-06-23 | End: 2022-07-03

## 2022-06-23 NOTE — Clinical Note
[address] EMERGENCY DEPARTMENT  [address] [facility] [address]  [phone]    Work/School Note    Date: 6/23/2022    To Whom It May concern:      Luis Scherer was seen and treated today in the emergency room by the following provider(s):  Nurse Practitioner: Ingrid Tran NP. Luis Scherer is excused from work/school on 06/23/22. She is clear to return to work/school on 06/24/22.         Sincerely,          Saw Crow NP

## 2022-06-23 NOTE — ED PROVIDER NOTES
EMERGENCY DEPARTMENT HISTORY AND PHYSICAL EXAM      Date: 6/23/2022  Patient Name: Yudy Diggs    History of Presenting Illness     Chief Complaint   Patient presents with    Ear Pain    Dental Pain       History Provided By: Patient    HPI: Yudy Diggs, 28 y.o. female with a past medical history significant No significant past medical history presents to the ED with cc of left upper molar and jaw pain that radiates to the left ear. Patient reports symptoms been present for the since yesterday. She reports use of Listerine, Epson salt, Tylenol with minimal relief in symptoms. She reports contacting the dentist unable to be seen until September. Denies any fever, chills, trismus, difficulty swallowing, drooling, injury, trauma. Of note patient reports use of Implanon. Denies having menstrual cycles at this time or concern for pregnancies. There are no other complaints, changes, or physical findings at this time. PCP: Matt Dawson NP    No current facility-administered medications on file prior to encounter.      Current Outpatient Medications on File Prior to Encounter   Medication Sig Dispense Refill    acetaminophen (TYLENOL) 500 mg tablet take 1 tablet by mouth every 4 hours if needed for pain  0    albuterol (PROVENTIL VENTOLIN) 2.5 mg /3 mL (0.083 %) nebulizer solution albuterol sulfate 2.5 mg/3 mL (0.083 %) solution for nebulization         Past History     Past Medical History:  Past Medical History:   Diagnosis Date    Asthma     Depression     FHx: mental illness     Headache     Mental health problem        Past Surgical History:  Past Surgical History:   Procedure Laterality Date    HX ORTHOPAEDIC         Family History:  Family History   Problem Relation Age of Onset    Cancer Paternal Uncle     Cancer Maternal Grandmother     Breast Cancer Maternal Grandmother     Hypertension Maternal Grandmother     Hypertension Mother        Social History:  Social History Tobacco Use    Smoking status: Current Some Day Smoker     Packs/day: 1.00    Smokeless tobacco: Never Used   Vaping Use    Vaping Use: Never used   Substance Use Topics    Alcohol use: No    Drug use: No       Allergies: Allergies   Allergen Reactions    Ibuprofen Diarrhea         Review of Systems     Review of Systems   Constitutional: Negative for chills and fever. HENT: Positive for dental problem and ear pain. Negative for congestion, ear discharge, facial swelling, sinus pressure, sinus pain and trouble swallowing. Respiratory: Negative for shortness of breath. Gastrointestinal: Negative for nausea and vomiting. Skin: Negative. Neurological: Negative for dizziness, weakness, light-headedness, numbness and headaches. Psychiatric/Behavioral: Negative. All other systems reviewed and are negative. Physical Exam     Physical Exam  Vitals and nursing note reviewed. Constitutional:       General: She is not in acute distress. Appearance: Normal appearance. She is normal weight. She is not ill-appearing or toxic-appearing. HENT:      Head: Normocephalic and atraumatic. Right Ear: Hearing normal. No decreased hearing noted. No drainage, swelling or tenderness. There is impacted cerumen. No mastoid tenderness. Left Ear: Hearing normal. No decreased hearing noted. Tenderness present. No drainage or swelling. No mastoid tenderness. Ears:        Comments: Tender to anterior ear     Nose: Nose normal.      Mouth/Throat:      Mouth: Mucous membranes are moist.      Dentition: Dental tenderness, gingival swelling and dental caries present. No dental abscesses. Tonsils: No tonsillar exudate or tonsillar abscesses. Eyes:      General: Lids are normal.      Extraocular Movements: Extraocular movements intact. Pupils: Pupils are equal, round, and reactive to light. Cardiovascular:      Rate and Rhythm: Normal rate and regular rhythm.       Pulses: Normal pulses. Radial pulses are 2+ on the right side and 2+ on the left side. Dorsalis pedis pulses are 2+ on the right side and 2+ on the left side. Pulmonary:      Effort: Pulmonary effort is normal. No accessory muscle usage. Musculoskeletal:         General: Normal range of motion. Cervical back: Normal range of motion and neck supple. No muscular tenderness. Feet:      Right foot:      Skin integrity: No skin breakdown. Left foot:      Skin integrity: No skin breakdown. Lymphadenopathy:      Cervical: No cervical adenopathy. Skin:     General: Skin is warm and dry. Capillary Refill: Capillary refill takes less than 2 seconds. Findings: No abrasion, bruising, ecchymosis, erythema or signs of injury. Neurological:      Mental Status: She is alert and oriented to person, place, and time. GCS: GCS eye subscore is 4. GCS verbal subscore is 5. GCS motor subscore is 6. Cranial Nerves: Cranial nerves are intact. Sensory: Sensation is intact. Psychiatric:         Attention and Perception: Attention normal.         Mood and Affect: Mood normal.         Behavior: Behavior normal. Behavior is cooperative. Cognition and Memory: Cognition normal.         Lab and Diagnostic Study Results     Labs -   No results found for this or any previous visit (from the past 12 hour(s)). Radiologic Studies -   @lastxrresult@  CT Results  (Last 48 hours)    None        CXR Results  (Last 48 hours)    None            Medical Decision Making   - I am the first provider for this patient. - I reviewed the vital signs, available nursing notes, past medical history, past surgical history, family history and social history. - Initial assessment performed. The patients presenting problems have been discussed, and they are in agreement with the care plan formulated and outlined with them.   I have encouraged them to ask questions as they arise throughout their visit.    Vital Signs-Reviewed the patient's vital signs. Patient Vitals for the past 12 hrs:   Temp Pulse Resp BP SpO2   06/23/22 1224 98.2 °F (36.8 °C) 61 16 114/76 99 %       Records Reviewed: Nursing Notes    The patient presents with dental pain with a differential diagnosis of abscess, carriers, fracture, gingivitis      ED Course:          Provider Notes (Medical Decision Making):   Patient afebrile not tachycardic SPO2 99% on room air. Complaints of discomfort to the left upper molar region gingival swelling noted to site. No facial swelling however tenderness with palpation to the left maxillary region anterior aspect of the left ear. Patient reports performing supportive care with no relief in symptoms Rx for antibiotics given due to possible infection follow-up with dentist for further evaluation patient verbalized understanding stable at time of discharge      Procedures   Medical Decision Makingedical Decision Making  Performed by: Huseyin Main NP  PROCEDURES:  Procedures       Disposition   Disposition: DC- Adult Discharges: All of the diagnostic tests were reviewed and questions answered. Diagnosis, care plan and treatment options were discussed. The patient understands the instructions and will follow up as directed. The patients results have been reviewed with them. They have been counseled regarding their diagnosis. The patient verbally convey understanding and agreement of the signs, symptoms, diagnosis, treatment and prognosis and additionally agrees to follow up as recommended with their PCP in 24 - 48 hours. They also agree with the care-plan and convey that all of their questions have been answered.   I have also put together some discharge instructions for them that include: 1) educational information regarding their diagnosis, 2) how to care for their diagnosis at home, as well a 3) list of reasons why they would want to return to the ED prior to their follow-up appointment, should their condition change. Discharged    DISCHARGE PLAN:  1. Current Discharge Medication List      START taking these medications    Details   amoxicillin-clavulanate (Augmentin) 875-125 mg per tablet Take 1 Tablet by mouth two (2) times a day for 10 days. Qty: 20 Tablet, Refills: 0         CONTINUE these medications which have NOT CHANGED    Details   acetaminophen (TYLENOL) 500 mg tablet take 1 tablet by mouth every 4 hours if needed for pain  Refills: 0      albuterol (PROVENTIL VENTOLIN) 2.5 mg /3 mL (0.083 %) nebulizer solution albuterol sulfate 2.5 mg/3 mL (0.083 %) solution for nebulization           2. Follow-up Information     Follow up With Specialties Details Why Contact Info    Jude Hernandez NP Nurse Practitioner Schedule an appointment as soon as possible for a visit in 2 days As needed, If symptoms worsen [address]  [place] [address] [address] [facility] [address]      [address] [facility] Schedule an appointment as soon as possible for a visit in 1 week  [address] [place] [address]  [phone]        3. Return to ED if worse   4. Current Discharge Medication List      START taking these medications    Details   amoxicillin-clavulanate (Augmentin) 875-125 mg per tablet Take 1 Tablet by mouth two (2) times a day for 10 days. Qty: 20 Tablet, Refills: 0  Start date: 6/23/2022, End date: 7/3/2022      carbamide peroxide (Debrox) 6.5 % otic solution Administer 5 Drops into each ear two (2) times a day. Qty: 30 mL, Refills: 0  Start date: 6/23/2022               Diagnosis     Clinical Impression:   1. Pain, dental    2. Gingival swelling    3. Impacted cerumen of right ear        Attestations:    Saw Crow NP    Please note that this dictation was completed with [employer], the [employer] voice recognition software. Quite often unanticipated grammatical, syntax, homophones, and other interpretive errors are inadvertently transcribed by the computer software. Please disregard these errors. Please excuse any errors that have escaped final proofreading. Thank you.

## 2022-06-23 NOTE — Clinical Note
[address] EMERGENCY DEPARTMENT  [address] [address]  [phone]    Work/School Note    Date: 6/23/2022    To Whom It May concern:      Franklin Davey was seen and treated today in the emergency room by the following provider(s):  Nurse Practitioner: North Ordonez NP. Franklin Davey is excused from work/school on 06/23/22. She is clear to return to work/school on 06/24/22.         Sincerely,          Parvin Nevarez RN

## 2022-07-20 ENCOUNTER — HOSPITAL ENCOUNTER (EMERGENCY)
Age: 27
Discharge: HOME | End: 2022-07-20
Attending: EMERGENCY MEDICINE
Payer: MEDICAID

## 2022-07-20 VITALS
BODY MASS INDEX: 25.74 KG/M2 | TEMPERATURE: 102.8 F | HEART RATE: 109 BPM | OXYGEN SATURATION: 98 % | HEIGHT: 67 IN | SYSTOLIC BLOOD PRESSURE: 126 MMHG | DIASTOLIC BLOOD PRESSURE: 78 MMHG | RESPIRATION RATE: 18 BRPM | WEIGHT: 164 LBS

## 2022-07-20 DIAGNOSIS — R50.9 FEVER, UNSPECIFIED FEVER CAUSE: Primary | ICD-10-CM

## 2022-07-20 DIAGNOSIS — B34.9 VIRAL ILLNESS: ICD-10-CM

## 2022-07-20 LAB
FLUAV AG NPH QL IA: NEGATIVE
FLUBV AG NOSE QL IA: NEGATIVE
SARS-COV-2, COV2: NORMAL

## 2022-07-20 PROCEDURE — U0005 INFEC AGEN DETEC AMPLI PROBE: HCPCS

## 2022-07-20 PROCEDURE — 87804 INFLUENZA ASSAY W/OPTIC: CPT

## 2022-07-20 PROCEDURE — 74011250637 HC RX REV CODE- 250/637: Performed by: EMERGENCY MEDICINE

## 2022-07-20 PROCEDURE — 99283 EMERGENCY DEPT VISIT LOW MDM: CPT

## 2022-07-20 RX ORDER — IBUPROFEN 800 MG/1
800 TABLET ORAL
Qty: 20 TABLET | Refills: 0 | Status: SHIPPED | OUTPATIENT
Start: 2022-07-20 | End: 2022-07-27

## 2022-07-20 RX ORDER — PREDNISONE 20 MG/1
TABLET ORAL
Qty: 12 TABLET | Refills: 0 | Status: SHIPPED | OUTPATIENT
Start: 2022-07-20

## 2022-07-20 RX ORDER — IBUPROFEN 800 MG/1
800 TABLET ORAL ONCE
Status: COMPLETED | OUTPATIENT
Start: 2022-07-20 | End: 2022-07-20

## 2022-07-20 RX ORDER — ONDANSETRON 4 MG/1
4 TABLET, FILM COATED ORAL
Qty: 12 TABLET | Refills: 0 | Status: SHIPPED | OUTPATIENT
Start: 2022-07-20 | End: 2022-07-24

## 2022-07-20 RX ORDER — ACETAMINOPHEN 500 MG
1000 TABLET ORAL ONCE
Status: COMPLETED | OUTPATIENT
Start: 2022-07-20 | End: 2022-07-20

## 2022-07-20 RX ADMIN — IBUPROFEN 800 MG: 800 TABLET, FILM COATED ORAL at 22:00

## 2022-07-20 RX ADMIN — ACETAMINOPHEN 1000 MG: 500 TABLET ORAL at 22:00

## 2022-07-20 NOTE — Clinical Note
[address] EMERGENCY DEPARTMENT  [address] [address]  [phone]    Work/School Note    Date: 7/20/2022     To Whom It May concern:    David Cisneros was evaluated by the following provider(s):  Attending Provider: Chas Marti MD.   Liz Delroy virus is suspected. Per the CDC guidelines we recommend home isolation until the following conditions are all met:    1. At least five days have passed since symptoms first appeared and/or had a close exposure,   2. After home isolation for five days, wearing a mask around others for the next five days,  3. At least 24 have passed since last fever without the use of fever-reducing medications and  4.  Symptoms (eg cough, shortness of breath) have improved      Sincerely,          Jacek Torres MD

## 2022-07-21 LAB
SARS-COV-2, COV2: DETECTED
SPECIMEN SOURCE: ABNORMAL

## 2022-07-21 NOTE — ED PROVIDER NOTES
EMERGENCY DEPARTMENT HISTORY AND PHYSICAL EXAM      Date: 7/20/2022  Patient Name: Will Adams    History of Presenting Illness     Chief Complaint   Patient presents with    Headache    Generalized Body Aches    Nausea    Chills    Sore Throat       History Provided By: Patient    HPI: Will Adams, 28 y.o. female   presents to the ED with cc of fever. Patient complains of sudden onset of subjective fever and chills along with a generalized body ache and generalized headache this morning. Mild sore throat. Intermittent episode of nonproductive cough without shortness of breath. Generalized headache without photophobia or history of head injury. Patient states that she was exposed to the same symptoms from her children recently. No COVID vaccination      PCP: Nola Kaiser NP    No current facility-administered medications on file prior to encounter. Current Outpatient Medications on File Prior to Encounter   Medication Sig Dispense Refill    carbamide peroxide (Debrox) 6.5 % otic solution Administer 5 Drops into each ear two (2) times a day.  30 mL 0    acetaminophen (TYLENOL) 500 mg tablet take 1 tablet by mouth every 4 hours if needed for pain  0    albuterol (PROVENTIL VENTOLIN) 2.5 mg /3 mL (0.083 %) nebulizer solution albuterol sulfate 2.5 mg/3 mL (0.083 %) solution for nebulization         Past History     Past Medical History:  Past Medical History:   Diagnosis Date    Asthma     Depression     FHx: mental illness     Headache     Mental health problem     Nicotine vapor product user        Past Surgical History:  Past Surgical History:   Procedure Laterality Date    HX ORTHOPAEDIC         Family History:  Family History   Problem Relation Age of Onset    Cancer Paternal Uncle     Cancer Maternal Grandmother     Breast Cancer Maternal Grandmother     Hypertension Maternal Grandmother     Hypertension Mother        Social History:  Social History     Tobacco Use    Smoking status: Some Days     Packs/day: 1.00     Types: Cigarettes    Smokeless tobacco: Never   Vaping Use    Vaping Use: Never used   Substance Use Topics    Alcohol use: No    Drug use: No       Allergies: Allergies   Allergen Reactions    Ibuprofen Diarrhea         Review of Systems   Review of Systems   Constitutional:  Positive for chills and fever. HENT:  Positive for rhinorrhea and sore throat. Eyes:  Negative for discharge. Respiratory:  Negative for shortness of breath. Cardiovascular:  Negative for chest pain. Gastrointestinal:  Positive for nausea. Negative for abdominal pain and vomiting. Genitourinary:  Negative for dysuria. Musculoskeletal:  Negative for joint swelling. Skin:  Negative for rash. Neurological:  Positive for headaches. Psychiatric/Behavioral:  Negative for suicidal ideas. All other systems reviewed and are negative. Physical Exam   Physical Exam  Vitals and nursing note reviewed. Constitutional:       General: She is not in acute distress. Appearance: Normal appearance. She is not ill-appearing, toxic-appearing or diaphoretic. HENT:      Head: Normocephalic and atraumatic. Nose: Congestion present. Mouth/Throat:      Mouth: Mucous membranes are moist.   Eyes:      Extraocular Movements: Extraocular movements intact. Conjunctiva/sclera: Conjunctivae normal.      Pupils: Pupils are equal, round, and reactive to light. Cardiovascular:      Rate and Rhythm: Normal rate and regular rhythm. Heart sounds: Normal heart sounds. Pulmonary:      Effort: Pulmonary effort is normal.      Breath sounds: Normal breath sounds. No wheezing or rhonchi. Abdominal:      General: Abdomen is flat. Bowel sounds are normal. There is no distension. Palpations: Abdomen is soft. Tenderness: There is no abdominal tenderness. There is no guarding or rebound. Musculoskeletal:      Cervical back: Neck supple. No rigidity. Right lower leg: No edema.       Left lower leg: No edema. Skin:     General: Skin is warm and dry. Neurological:      General: No focal deficit present. Mental Status: She is alert and oriented to person, place, and time. Cranial Nerves: No cranial nerve deficit. Motor: No weakness. Psychiatric:         Behavior: Behavior normal.         Thought Content: Thought content normal.       Diagnostic Study Results     Labs -     Recent Results (from the past 12 hour(s))   INFLUENZA A & B AG (RAPID TEST)    Collection Time: 07/20/22  9:45 PM   Result Value Ref Range    Influenza A Antigen Negative Negative      Influenza B Antigen Negative Negative     SARS-COV-2    Collection Time: 07/20/22  9:45 PM   Result Value Ref Range    SARS-CoV-2 by PCR Please find results under separate order         Radiologic Studies -   No orders to display     CT Results  (Last 48 hours)      None          CXR Results  (Last 48 hours)      None              Medical Decision Making   I am the first provider for this patient. I reviewed the vital signs, available nursing notes, past medical history, past surgical history, family history and social history. Vital Signs-Reviewed the patient's vital signs. Patient Vitals for the past 12 hrs:   Temp Pulse Resp BP SpO2   07/20/22 2133 (!) 102.8 °F (39.3 °C) (!) 109 18 126/78 98 %       Records Reviewed:     Provider Notes (Medical Decision Making):       ED Course:   Initial assessment performed. The patients presenting problems have been discussed, and they are in agreement with the care plan formulated and outlined with them. I have encouraged them to ask questions as they arise throughout their visit. PROCEDURES      Disposition: Condition stable   DC- Adult Discharges: All of the diagnostic tests were reviewed and questions answered. Diagnosis, care plan and treatment options were discussed. understand instructions and will follow up as directed.  The patients results have been reviewed with them.  They have been counseled regarding their diagnosis. The patient verbally convey understanding and agreement of the signs, symptoms, diagnosis, treatment and prognosis and additionally agrees to follow up as recommended. They also agree with the care-plan and convey that all of their questions have been answered. I have also put together some discharge instructions for them that include: 1) educational information regarding their diagnosis, 2) how to care for their diagnosis at home, as well a 3) list of reasons why they would want to return to the ED prior to their follow-up appointment, should their condition change. PLAN:  1. Current Discharge Medication List        START taking these medications    Details   ibuprofen (MOTRIN) 800 mg tablet Take 1 Tablet by mouth every eight (8) hours as needed for Pain for up to 7 days. Qty: 20 Tablet, Refills: 0  Start date: 7/20/2022, End date: 7/27/2022      predniSONE (DELTASONE) 20 mg tablet 3 tablets for 2 days then 2 tablets for 2 days then 1 tablet for 2 day  Qty: 12 Tablet, Refills: 0  Start date: 7/20/2022      ondansetron hcl (Zofran) 4 mg tablet Take 1 Tablet by mouth every eight (8) hours as needed for Nausea or Vomiting for up to 4 days. Qty: 12 Tablet, Refills: 0  Start date: 7/20/2022, End date: 7/24/2022           2. Follow-up Information       Follow up With Specialties Details Why Contact Info    Follow up with your primary care physician  Schedule an appointment as soon as possible for a visit in 3 days As needed           Return to ED if worse     Diagnosis     Clinical Impression:   1. Fever, unspecified fever cause    2. Viral illness        Please note that this dictation was completed with [employer], the [employer] voice recognition software. Quite often unanticipated grammatical, syntax, homophones, and other interpretive errors are inadvertently transcribed by the computer software. Please disregard these errors.   Please excuse any errors that have escaped final proofreading. Thank you.

## 2022-07-21 NOTE — ED NOTES
Pt a&ox4. GCS 15. Pt self ambulated out of ED with discharge paperwork and personal belongings. Pt instructed to increase water intake at home and how to alternate tylenol and ibuprofen for her fever, pt verbalized understanding.

## 2022-07-21 NOTE — ED TRIAGE NOTES
Pt states her kids recently had a cold with a cough and today she started having generalized body aches, headache, sore throat, nausea and chills. Pt has fever in triage, hasn't checked temp all day; no meds taken today.

## 2022-12-06 ENCOUNTER — HOSPITAL ENCOUNTER (EMERGENCY)
Age: 27
Discharge: HOME | End: 2022-12-06
Payer: MEDICAID

## 2022-12-06 VITALS
WEIGHT: 160 LBS | DIASTOLIC BLOOD PRESSURE: 70 MMHG | RESPIRATION RATE: 16 BRPM | HEIGHT: 67 IN | BODY MASS INDEX: 25.11 KG/M2 | TEMPERATURE: 98.7 F | OXYGEN SATURATION: 97 % | SYSTOLIC BLOOD PRESSURE: 104 MMHG | HEART RATE: 87 BPM

## 2022-12-06 DIAGNOSIS — B34.9 VIRAL ILLNESS: Primary | ICD-10-CM

## 2022-12-06 LAB
FLUAV AG NPH QL IA: NEGATIVE
FLUBV AG NOSE QL IA: NEGATIVE

## 2022-12-06 PROCEDURE — 87804 INFLUENZA ASSAY W/OPTIC: CPT

## 2022-12-06 PROCEDURE — 74011250637 HC RX REV CODE- 250/637: Performed by: NURSE PRACTITIONER

## 2022-12-06 PROCEDURE — 99283 EMERGENCY DEPT VISIT LOW MDM: CPT

## 2022-12-06 RX ORDER — GALCANEZUMAB 120 MG/ML
INJECTION, SOLUTION SUBCUTANEOUS
COMMUNITY

## 2022-12-06 RX ORDER — ACETAMINOPHEN 325 MG/1
650 TABLET ORAL
Qty: 20 TABLET | Refills: 0 | Status: SHIPPED | OUTPATIENT
Start: 2022-12-06

## 2022-12-06 RX ORDER — ACETAMINOPHEN 500 MG
1000 TABLET ORAL ONCE
Status: COMPLETED | OUTPATIENT
Start: 2022-12-06 | End: 2022-12-06

## 2022-12-06 RX ORDER — PROMETHAZINE HYDROCHLORIDE AND DEXTROMETHORPHAN HYDROBROMIDE 6.25; 15 MG/5ML; MG/5ML
5 SYRUP ORAL
Qty: 180 ML | Refills: 0 | Status: SHIPPED | OUTPATIENT
Start: 2022-12-06 | End: 2022-12-13

## 2022-12-06 RX ADMIN — ACETAMINOPHEN 1000 MG: 500 TABLET ORAL at 09:28

## 2022-12-06 NOTE — DISCHARGE INSTRUCTIONS
Thank you! Thank you for allowing me to care for you in the emergency department. It is my goal to provide you with excellent care. If you have not received excellent quality care, please ask to speak to the nurse manager. Please fill out the survey that will come to you by mail or email since we listen to your feedback! Below you will find a list of your tests from today's visit. Should you have any questions, please do not hesitate to call the emergency department. Labs  Recent Results (from the past 12 hour(s))   INFLUENZA A & B AG (RAPID TEST)    Collection Time: 12/06/22  9:30 AM   Result Value Ref Range    Influenza A Antigen Negative Negative      Influenza B Antigen Negative Negative         Radiologic Studies  No orders to display     CT Results  (Last 48 hours)      None          CXR Results  (Last 48 hours)      None          ------------------------------------------------------------------------------------------------------------  The exam and treatment you received in the Emergency Department were for an urgent problem and are not intended as complete care. It is important that you follow-up with a doctor, nurse practitioner, or physician assistant to:  (1) confirm your diagnosis,  (2) re-evaluation of changes in your illness and treatment, and  (3) for ongoing care. Please take your discharge instructions with you when you go to your follow-up appointment. If you have any problem arranging a follow-up appointment, contact the Emergency Department. If your symptoms become worse or you do not improve as expected and you are unable to reach your health care provider, please return to the Emergency Department. We are available 24 hours a day. If a prescription has been provided, please have it filled as soon as possible to prevent a delay in treatment.  If you have any questions or reservations about taking the medication due to side effects or interactions with other medications, please call your primary care provider or contact the ER.

## 2022-12-06 NOTE — ED TRIAGE NOTES
Pt states that she has had body aches and chills since yesterday. She has migraines and it is worse than ever. No appetite, nausea.   Denies v/d

## 2022-12-06 NOTE — Clinical Note
[address] EMERGENCY DEPARTMENT  [address] [facility] [address]  [phone]    Work/School Note    Date: 12/6/2022    To Whom It May concern:    Radha Phipps was seen and treated today in the emergency room by the following provider(s):  Nurse Practitioner: Jose Johnston NP. Radha Phipps is excused from work/school on 12/06/22 and 12/07/22. She is medically clear to return to work/school on 12/8/2022.        Sincerely,          Dae Savage NP

## 2022-12-06 NOTE — ED PROVIDER NOTES
EMERGENCY DEPARTMENT HISTORY AND PHYSICAL EXAM      Date: 12/6/2022  Patient Name: Brannon Feliciano    History of Presenting Illness     Chief Complaint   Patient presents with    Generalized Body Aches    Headache    Sore Throat       History Provided By: Patient    HPI: Brannon Feliciano, 28 y.o. female asthma, migraines complains of HA, body aches, chills, sore throat since yesterday. She reports using Tylenol Cold and flu and Excedrin with improvement in symptoms however they represent. She does admit to having 2 little ones at home who have been running fevers as well. She denies any chest pain, shortness of breath, cough, nausea, vomiting abdominal pain lightheaded or dizziness. There are no other complaints, changes, or physical findings at this time. Past History     Past Medical History:  Past Medical History:   Diagnosis Date    Asthma     Depression     FHx: mental illness     Headache     Mental health problem     Nicotine vapor product user        Past Surgical History:  Past Surgical History:   Procedure Laterality Date    HX ORTHOPAEDIC         Family History:  Family History   Problem Relation Age of Onset    Cancer Paternal Uncle     Cancer Maternal Grandmother     Breast Cancer Maternal Grandmother     Hypertension Maternal Grandmother     Hypertension Mother        Social History:  Social History     Tobacco Use    Smoking status: Some Days     Packs/day: 1.00     Types: Cigarettes    Smokeless tobacco: Never   Vaping Use    Vaping Use: Never used   Substance Use Topics    Alcohol use: No    Drug use: No       Allergies: Allergies   Allergen Reactions    Ibuprofen Diarrhea       PCP: Jackeline Cedillo NP    No current facility-administered medications on file prior to encounter. Current Outpatient Medications on File Prior to Encounter   Medication Sig Dispense Refill    galcanezumab-gnlm (Emgality Syringe) 120 mg/mL syrg by SubCUTAneous route every twenty-eight (28) days. [DISCONTINUED] predniSONE (DELTASONE) 20 mg tablet 3 tablets for 2 days then 2 tablets for 2 days then 1 tablet for 2 day 12 Tablet 0    [DISCONTINUED] carbamide peroxide (Debrox) 6.5 % otic solution Administer 5 Drops into each ear two (2) times a day. 30 mL 0    [DISCONTINUED] acetaminophen (TYLENOL) 500 mg tablet take 1 tablet by mouth every 4 hours if needed for pain  0    [DISCONTINUED] albuterol (PROVENTIL VENTOLIN) 2.5 mg /3 mL (0.083 %) nebulizer solution albuterol sulfate 2.5 mg/3 mL (0.083 %) solution for nebulization         Review of Systems   Review of Systems   Constitutional:  Positive for chills. Negative for fever. HENT:  Positive for sore throat. Negative for congestion, sinus pressure, sinus pain and trouble swallowing. Respiratory:  Negative for cough and shortness of breath. Cardiovascular:  Negative for chest pain. Gastrointestinal:  Negative for abdominal pain, nausea and vomiting. Musculoskeletal:  Negative for myalgias. Skin: Negative. Neurological:  Positive for headaches. Negative for dizziness and light-headedness. Psychiatric/Behavioral: Negative. All other systems reviewed and are negative. Physical Exam   Physical Exam  Vitals and nursing note reviewed. Constitutional:       General: She is not in acute distress. Appearance: Normal appearance. She is normal weight. She is not ill-appearing or toxic-appearing. HENT:      Head: Normocephalic and atraumatic. Right Ear: Hearing, tympanic membrane and ear canal normal. No drainage, swelling or tenderness. Left Ear: Hearing, tympanic membrane and ear canal normal. No drainage, swelling or tenderness. Nose: No congestion. Mouth/Throat:      Mouth: Mucous membranes are moist.      Pharynx: Uvula midline. No oropharyngeal exudate or posterior oropharyngeal erythema. Tonsils: No tonsillar exudate or tonsillar abscesses. 0 on the right. 0 on the left.    Eyes:      General: Lids are normal.      Extraocular Movements: Extraocular movements intact. Pupils: Pupils are equal, round, and reactive to light. Cardiovascular:      Rate and Rhythm: Normal rate and regular rhythm. Pulses: Normal pulses. Radial pulses are 2+ on the right side and 2+ on the left side. Dorsalis pedis pulses are 2+ on the right side and 2+ on the left side. Pulmonary:      Effort: Pulmonary effort is normal. No accessory muscle usage or respiratory distress. Breath sounds: Normal breath sounds. No wheezing or rhonchi. Abdominal:      General: Bowel sounds are normal.      Palpations: Abdomen is soft. Tenderness: There is no abdominal tenderness. There is no right CVA tenderness or left CVA tenderness. Musculoskeletal:         General: Normal range of motion. Cervical back: Normal range of motion and neck supple. No muscular tenderness. Right lower leg: No edema. Left lower leg: No edema. Feet:      Right foot:      Skin integrity: No skin breakdown. Left foot:      Skin integrity: No skin breakdown. Skin:     General: Skin is warm and dry. Capillary Refill: Capillary refill takes less than 2 seconds. Findings: No abrasion, bruising, ecchymosis, erythema or signs of injury. Neurological:      Mental Status: She is alert and oriented to person, place, and time. GCS: GCS eye subscore is 4. GCS verbal subscore is 5. GCS motor subscore is 6. Sensory: Sensation is intact. Psychiatric:         Attention and Perception: Attention normal.         Mood and Affect: Mood normal.         Behavior: Behavior normal. Behavior is cooperative.          Cognition and Memory: Cognition normal.       Lab and Diagnostic Study Results   Labs -     Recent Results (from the past 12 hour(s))   INFLUENZA A & B AG (RAPID TEST)    Collection Time: 12/06/22  9:30 AM   Result Value Ref Range    Influenza A Antigen Negative Negative      Influenza B Antigen Negative Negative         Radiologic Studies -   @lastxrresult@  CT Results  (Last 48 hours)      None          CXR Results  (Last 48 hours)      None            Medical Decision Making and ED Course   Differential Diagnosis & Medical Decision Making Provider Note: Pneumonia, bronchitis, viral illness, URI, influenza, COVID    Patient afebrile initially tachycardic has since improved SPO2 100% on room air. Lungs clear to auscultation no rales rhonchi wheezing no acute respiratory distress low concern for pneumonia at this time. Influenza negative. More than likely viral. Advised supportive care increase fluids signs symptoms to return to emergency department. Patient verbalized understanding stable at time of discharge. - I am the first provider for this patient. I reviewed the vital signs, available nursing notes, past medical history, past surgical history, family history and social history. The patients presenting problems have been discussed, and they are in agreement with the care plan formulated and outlined with them. I have encouraged them to ask questions as they arise throughout their visit. Vital Signs-Reviewed the patient's vital signs. Patient Vitals for the past 12 hrs:   Temp Pulse Resp BP SpO2   12/06/22 0850 98.7 °F (37.1 °C) (!) 108 16 104/70 100 %       ED Course:      Procedures   Performed by: Carli PresKARINE cardoza  Procedures      Disposition   Disposition: DC- Adult Discharges: All of the diagnostic tests were reviewed and questions answered. Diagnosis, care plan and treatment options were discussed. The patient understands the instructions and will follow up as directed. The patients results have been reviewed with them. They have been counseled regarding their diagnosis. The patient verbally convey understanding and agreement of the signs, symptoms, diagnosis, treatment and prognosis and additionally agrees to follow up as recommended with their PCP in 24 - 48 hours.   They also agree with the care-plan and convey that all of their questions have been answered. I have also put together some discharge instructions for them that include: 1) educational information regarding their diagnosis, 2) how to care for their diagnosis at home, as well a 3) list of reasons why they would want to return to the ED prior to their follow-up appointment, should their condition change. DISCHARGE PLAN:  1. Current Discharge Medication List        CONTINUE these medications which have NOT CHANGED    Details   galcanezumab-gnlm (Emgality Syringe) 120 mg/mL syrg by SubCUTAneous route every twenty-eight (28) days. 2.   Follow-up Information       Follow up With Specialties Details Why Contact Info    Cong Chavira NP Nurse Practitioner Schedule an appointment as soon as possible for a visit in 1 week As needed, If symptoms worsen [address]            3. Return to ED if worse   4. Current Discharge Medication List        START taking these medications    Details   acetaminophen (TYLENOL) 325 mg tablet Take 2 Tablets by mouth every four (4) hours as needed for Pain. Qty: 20 Tablet, Refills: 0  Start date: 12/6/2022      promethazine-dextromethorphan (PROMETHAZINE-DM) 6.25-15 mg/5 mL syrup Take 5 mL by mouth every four (4) hours as needed for Cough for up to 7 days. Qty: 180 mL, Refills: 0  Start date: 12/6/2022, End date: 12/13/2022      benzocaine-menthoL (Cepacol Sore Throat, jaquelin-men,) 15-2.6 mg lozg lozenge Take 1 Lozenge by mouth every two (2) hours as needed for Sore throat. Qty: 16 Lozenge, Refills: 0  Start date: 12/6/2022             Diagnosis/Clinical Impression     Clinical Impression:   1. Viral illness        Attestations: Kelsey Howard NP, am the primary clinician of record. Please note that this dictation was completed with [employer], the [employer] voice recognition software.   Quite often unanticipated grammatical, syntax, homophones, and other interpretive errors are inadvertently transcribed by the computer software. Please disregard these errors. Please excuse any errors that have escaped final proofreading. Thank you.

## 2023-01-24 ENCOUNTER — OFFICE VISIT (OUTPATIENT)
Dept: OBGYN CLINIC | Age: 28
End: 2023-01-24
Payer: MEDICAID

## 2023-01-24 VITALS
HEIGHT: 67 IN | DIASTOLIC BLOOD PRESSURE: 73 MMHG | BODY MASS INDEX: 23.98 KG/M2 | WEIGHT: 152.8 LBS | RESPIRATION RATE: 19 BRPM | SYSTOLIC BLOOD PRESSURE: 120 MMHG | HEART RATE: 74 BPM | TEMPERATURE: 97.2 F | OXYGEN SATURATION: 97 %

## 2023-01-24 DIAGNOSIS — N94.6 DYSMENORRHEA: ICD-10-CM

## 2023-01-24 DIAGNOSIS — N92.6 IRREGULAR MENSES: Primary | ICD-10-CM

## 2023-01-24 DIAGNOSIS — Z11.3 VENEREAL DISEASE SCREENING: ICD-10-CM

## 2023-01-24 PROBLEM — E55.9 VITAMIN D DEFICIENCY: Status: ACTIVE | Noted: 2023-01-24

## 2023-01-24 PROBLEM — F41.9 ANXIETY: Status: ACTIVE | Noted: 2023-01-24

## 2023-01-24 PROBLEM — G47.00 INSOMNIA: Status: ACTIVE | Noted: 2023-01-24

## 2023-01-24 PROBLEM — F31.9 BIPOLAR 1 DISORDER (HCC): Status: ACTIVE | Noted: 2023-01-24

## 2023-01-24 PROBLEM — J45.20 MILD INTERMITTENT ASTHMA: Status: ACTIVE | Noted: 2023-01-24

## 2023-01-24 PROBLEM — G93.32 CHRONIC FATIGUE SYNDROME: Status: ACTIVE | Noted: 2023-01-24

## 2023-01-24 PROBLEM — G43.009 MIGRAINE WITHOUT AURA, NOT REFRACTORY: Status: ACTIVE | Noted: 2023-01-24

## 2023-01-24 PROCEDURE — 99213 OFFICE O/P EST LOW 20 MIN: CPT | Performed by: OBSTETRICS & GYNECOLOGY

## 2023-01-24 NOTE — PROGRESS NOTES
Chief Complaint   Patient presents with    Other     Irregular menses with nexplanon     1. Have you been to the ER, urgent care clinic since your last visit? Hospitalized since your last visit? No    2. Have you seen or consulted any other health care providers outside of the [address] [place] [address] since your last visit? Include any pap smears or colon screening.  No    Visit Vitals  /73 (BP 1 Location: Right arm, BP Patient Position: Sitting, BP Cuff Size: Adult)   Pulse 74   Temp 97.2 °F (36.2 °C) (Temporal)   Resp 19   Ht 5' 7\" (1.702 m)   Wt 152 lb 12.8 oz (69.3 kg)   LMP 12/20/2022 (Approximate)   SpO2 97%   BMI 23.93 kg/m²

## 2023-01-24 NOTE — PROGRESS NOTES
HPI: David Cisneros is a 28 y.o. female [id number], LMP 12/20/22, who presents today for the following:  Chief Complaint   Patient presents with    Other     Irregular menses with nexplanon        Patient states she had Nexplanon placed by [facility] in August of 2021. She complains of monthly menses with this Nexplanon. In the past, she only had two periods a year with the other Nexplanons. With this Nexplanon, she complains of her menses being regular. It lasts for 7 days. Flow varies between light and heavy. +Dysmenorrhea. It has been ongoing since 2021. She is sexually active with one male partner, does not condoms. Denies h/o STIs. She denies abnormal vaginal discharge. Denies vaginal/vulvar pruritus. +Vaginal odor. Uses boric acid wash.        Past Medical History:   Diagnosis Date    Asthma     Depression     FHx: mental illness     Headache     Mental health problem     Nicotine vapor product user        Past Surgical History:   Procedure Laterality Date    HX ORTHOPAEDIC         Family History   Problem Relation Age of Onset    Cancer Paternal Uncle     Cancer Maternal Grandmother     Breast Cancer Maternal Grandmother     Hypertension Maternal Grandmother     Hypertension Mother        Social History     Socioeconomic History    Marital status: SINGLE     Spouse name: Not on file    Number of children: Not on file    Years of education: Not on file    Highest education level: Not on file   Occupational History    Not on file   Tobacco Use    Smoking status: Former     Packs/day: 1.00     Types: Cigarettes    Smokeless tobacco: Never   Vaping Use    Vaping Use: Never used   Substance and Sexual Activity    Alcohol use: Yes     Comment: occasion    Drug use: No    Sexual activity: Yes     Birth control/protection: Implant   Other Topics Concern    Not on file   Social History Narrative    Not on file     Social Determinants of Health     Financial Resource Strain: Not on file   Food Insecurity: Not on file   Transportation Needs: Not on file   Physical Activity: Not on file   Stress: Not on file   Social Connections: Not on file   Intimate Partner Violence: Not on file   Housing Stability: Not on file       Allergies   Allergen Reactions    Ibuprofen Diarrhea          Current Outpatient Medications:     galcanezumab-gnlm (Emgality Syringe) 120 mg/mL syrg, by SubCUTAneous route every twenty-eight (28) days. , Disp: , Rfl:     acetaminophen (TYLENOL) 325 mg tablet, Take 2 Tablets by mouth every four (4) hours as needed for Pain., Disp: 20 Tablet, Rfl: 0    benzocaine-menthoL (Cepacol Sore Throat, jaquelin-men,) 15-2.6 mg lozg lozenge, Take 1 Lozenge by mouth every two (2) hours as needed for Sore throat., Disp: 12 Lozenge, Rfl: 0       Review of Systems: Denies issues with eyes, ears, mouth, nose. Denies fevers/chills, significant weight loss/gain. Denies chest pain, shortness of breath, nausea, vomiting, constipation, diarrhea or abdominal pain. Denies dysuria. Denies muscle aches, weakness, numbness or tingling. Denies bleeding/clotting d/o's. Denies anxiety/depression, S/HI.      OBJECTIVE:  /73 (BP 1 Location: Right arm, BP Patient Position: Sitting, BP Cuff Size: Adult)   Pulse 74   Temp 97.2 °F (36.2 °C) (Temporal)   Resp 19   Ht 5' 7\" (1.702 m)   Wt 152 lb 12.8 oz (69.3 kg)   LMP 12/20/2022 (Approximate)   SpO2 97%   BMI 23.93 kg/m²      Constitutional  Appearance: well-nourished, well developed, alert, in no acute distress    HENT  Head and Face: appears normal    Neck  Inspection/Palpation: normal appearance      Chest  Respiratory Effort: normal      Gastrointestinal  Abdominal Examination: abdomen non-tender to palpation, no masses present    Genitourinary  External Genitalia: normal appearance for age, no discharge present, no tenderness present, no inflammatory lesions present, no masses present, no atrophy present  Vagina: normal vaginal vault without central or paravaginal defects, no discharge present, no inflammatory lesions present, no masses present  Bladder: non-tender to palpation  Urethra: appears normal  Cervix: normal, no cervical motion tenderness; small amt of white discharge, thin, swab obtained  Uterus: normal size, shape and consistency  Adnexa: no adnexal tenderness present, no adnexal masses present  Perineum: perineum within normal limits, no evidence of trauma, no rashes or skin lesions present  Anus: anus within normal limits, no hemorrhoids present    Skin  General Inspection: no rash, no lesions identified; Obadiah Long easily palpable in left arm    Neurologic/Psychiatric  Mental Status:  Orientation: grossly oriented to person, place and time  Mood and Affect: mood normal, affect appropriate          Assessment/plan:    ICD-10-CM ICD-9-CM    1. Irregular menses  N92.6 626.4 US PELV NON OB W TV      2. Dysmenorrhea  N94.6 625.3 US PELV NON OB W TV      NUSWAB VAGINITIS PLUS (VG+) WITH CANDIDA (SIX SPECIES)      3. Venereal disease screening  Z11.3 V74.5 NUSWAB VAGINITIS PLUS (VG+) WITH CANDIDA (SIX SPECIES)             She wants to keep Nexplanon in place as she struggles with taking pills. She does not want Depo or IUD. Reviewed potential side effects to include but not be limited to amenorrhea, irregular menses, GI upset, mood swings, headaches. Vaginal swab and pelvic ultrasound ordered.

## 2023-01-28 LAB
A VAGINAE DNA VAG QL NAA+PROBE: ABNORMAL SCORE
BVAB2 DNA VAG QL NAA+PROBE: ABNORMAL SCORE
C ALBICANS DNA VAG QL NAA+PROBE: POSITIVE
C GLABRATA DNA VAG QL NAA+PROBE: NEGATIVE
C KRUSEI DNA VAG QL NAA+PROBE: NEGATIVE
C LUSITANIAE DNA VAG QL NAA+PROBE: NEGATIVE
C TRACH DNA VAG QL NAA+PROBE: NEGATIVE
CANDIDA DNA VAG QL NAA+PROBE: NEGATIVE
MEGA1 DNA VAG QL NAA+PROBE: ABNORMAL SCORE
N GONORRHOEA DNA VAG QL NAA+PROBE: NEGATIVE
T VAGINALIS DNA VAG QL NAA+PROBE: NEGATIVE

## 2023-02-07 ENCOUNTER — PATIENT MESSAGE (OUTPATIENT)
Dept: OBGYN CLINIC | Age: 28
End: 2023-02-07

## 2023-03-23 ENCOUNTER — PATIENT MESSAGE (OUTPATIENT)
Dept: OBGYN CLINIC | Age: 28
End: 2023-03-23

## 2023-05-18 ENCOUNTER — HOSPITAL ENCOUNTER (EMERGENCY)
Facility: HOSPITAL | Age: 28
Discharge: HOME | End: 2023-05-18
Payer: MEDICAID

## 2023-05-18 VITALS
DIASTOLIC BLOOD PRESSURE: 78 MMHG | RESPIRATION RATE: 16 BRPM | BODY MASS INDEX: 25.74 KG/M2 | HEART RATE: 90 BPM | WEIGHT: 164 LBS | HEIGHT: 67 IN | SYSTOLIC BLOOD PRESSURE: 123 MMHG | OXYGEN SATURATION: 100 % | TEMPERATURE: 99.1 F

## 2023-05-18 DIAGNOSIS — Q66.52 CONGENITAL FLAT FOOT, LEFT: Primary | ICD-10-CM

## 2023-05-18 DIAGNOSIS — M79.672 LEFT FOOT PAIN: ICD-10-CM

## 2023-05-18 DIAGNOSIS — R59.0 LYMPHADENOPATHY, AXILLARY: ICD-10-CM

## 2023-05-18 PROCEDURE — 99283 EMERGENCY DEPT VISIT LOW MDM: CPT

## 2023-05-18 PROCEDURE — 6370000000 HC RX 637 (ALT 250 FOR IP)

## 2023-05-18 RX ORDER — CLINDAMYCIN HYDROCHLORIDE 150 MG/1
450 CAPSULE ORAL 3 TIMES DAILY
Qty: 63 CAPSULE | Refills: 0 | Status: SHIPPED | OUTPATIENT
Start: 2023-05-18 | End: 2023-05-25

## 2023-05-18 RX ORDER — LIDOCAINE 4 G/G
1 PATCH TOPICAL
Status: DISCONTINUED | OUTPATIENT
Start: 2023-05-18 | End: 2023-05-18 | Stop reason: HOSPADM

## 2023-05-18 RX ORDER — NAPROXEN 500 MG/1
500 TABLET ORAL
Status: COMPLETED | OUTPATIENT
Start: 2023-05-18 | End: 2023-05-18

## 2023-05-18 RX ORDER — NAPROXEN 500 MG/1
500 TABLET ORAL 2 TIMES DAILY WITH MEALS
Qty: 10 TABLET | Refills: 0 | Status: SHIPPED | OUTPATIENT
Start: 2023-05-18 | End: 2023-05-23

## 2023-05-18 RX ORDER — LIDOCAINE 4 G/G
1 PATCH TOPICAL DAILY
Qty: 5 PATCH | Refills: 0 | Status: SHIPPED | OUTPATIENT
Start: 2023-05-18 | End: 2023-05-23

## 2023-05-18 RX ORDER — ZINC OXIDE 13 %
1 CREAM (GRAM) TOPICAL DAILY
Qty: 15 CAPSULE | Refills: 0 | Status: SHIPPED | OUTPATIENT
Start: 2023-05-18 | End: 2023-06-02

## 2023-05-18 RX ADMIN — NAPROXEN 500 MG: 500 TABLET ORAL at 14:36

## 2023-05-18 ASSESSMENT — PAIN SCALES - GENERAL: PAINLEVEL_OUTOF10: 7

## 2023-05-18 ASSESSMENT — PAIN - FUNCTIONAL ASSESSMENT: PAIN_FUNCTIONAL_ASSESSMENT: 0-10

## 2023-05-18 NOTE — ED PROVIDER NOTES
[facility] EMERGENCY DEPARTMENT  EMERGENCY DEPARTMENT HISTORY AND PHYSICAL EXAM      Date: 5/18/2023  Patient Name: Ro Watts  MRN: [id number]  [place] 1995  Date of evaluation: 5/18/2023  Provider: CHUY Velasquez NP   Note Started: 1:55 PM EDT 5/18/23    HISTORY OF PRESENT ILLNESS     Chief Complaint   Patient presents with    Abscess    Ankle Pain       History Provided By: Patient    HPI: Ro Watts is a 33 y.o. female with a history of asthma, depression, mental health diagnosis presents with reported swelling and left foot pain. Patient states atraumatic onset of left bilateral foot pain approximately a month ago. Pain is sharp in nature, intermittent, without radiation. Exacerbated by weightbearing. She works in a [employer] and is on her feet all day. She is using topical treatment in the morning. She has a history of flatfeet but does not use her arch supports. Left axillary swelling onset yesterday and worsened this morning. Patient denies any viral symptoms. No history of the same. Site is painful without radiation. Constant. She says she thought it was an ingrown hair so she tried to use tweezers to pull the hairs out surrounding it. She denies fever, numbness, chest pain, difficulty breathing, neck pain, or dizziness. No alleviating or exacerbating factors. No OTC treatment.       PAST MEDICAL HISTORY   Past Medical History:  Past Medical History:   Diagnosis Date    Asthma     Depression     FHx: mental illness     Headache     Mental health problem     Nicotine vapor product user        Past Surgical History:  Past Surgical History:   Procedure Laterality Date    ORTHOPEDIC SURGERY         Family History:  Family History   Problem Relation Age of Onset    Hypertension Maternal Grandmother     Breast Cancer Maternal Grandmother     Cancer Maternal Grandmother     Cancer Paternal Uncle     Hypertension Mother        Social History:  Social History     Tobacco Use

## 2025-03-13 ENCOUNTER — OFFICE VISIT (OUTPATIENT)
Age: 30
End: 2025-03-13
Payer: COMMERCIAL

## 2025-03-13 VITALS
SYSTOLIC BLOOD PRESSURE: 119 MMHG | BODY MASS INDEX: 23.9 KG/M2 | WEIGHT: 152.3 LBS | HEIGHT: 67 IN | DIASTOLIC BLOOD PRESSURE: 80 MMHG

## 2025-03-13 DIAGNOSIS — N92.6 IRREGULAR MENSES: ICD-10-CM

## 2025-03-13 DIAGNOSIS — Z11.3 SCREENING EXAMINATION FOR VENEREAL DISEASE: Primary | ICD-10-CM

## 2025-03-13 LAB
BASOPHILS # BLD AUTO: 0.1 X10E3/UL (ref 0–0.2)
BASOPHILS NFR BLD AUTO: 1 %
EOSINOPHIL # BLD AUTO: 0.1 X10E3/UL (ref 0–0.4)
EOSINOPHIL NFR BLD AUTO: 2 %
ERYTHROCYTE [DISTWIDTH] IN BLOOD BY AUTOMATED COUNT: 14.6 % (ref 11.7–15.4)
HCT VFR BLD AUTO: 38.8 % (ref 34–46.6)
HGB BLD-MCNC: 11.8 G/DL (ref 11.1–15.9)
IMM GRANULOCYTES # BLD AUTO: 0 X10E3/UL (ref 0–0.1)
IMM GRANULOCYTES NFR BLD AUTO: 0 %
LYMPHOCYTES # BLD AUTO: 1.9 X10E3/UL (ref 0.7–3.1)
LYMPHOCYTES NFR BLD AUTO: 30 %
MCH RBC QN AUTO: 23.5 PG (ref 26.6–33)
MCHC RBC AUTO-ENTMCNC: 30.4 G/DL (ref 31.5–35.7)
MCV RBC AUTO: 77 FL (ref 79–97)
MONOCYTES # BLD AUTO: 0.4 X10E3/UL (ref 0.1–0.9)
MONOCYTES NFR BLD AUTO: 7 %
NEUTROPHILS # BLD AUTO: 3.7 X10E3/UL (ref 1.4–7)
NEUTROPHILS NFR BLD AUTO: 60 %
PLATELET # BLD AUTO: 511 X10E3/UL (ref 150–450)
RBC # BLD AUTO: 5.03 X10E6/UL (ref 3.77–5.28)
WBC # BLD AUTO: 6.1 X10E3/UL (ref 3.4–10.8)

## 2025-03-13 PROCEDURE — 99213 OFFICE O/P EST LOW 20 MIN: CPT | Performed by: NURSE PRACTITIONER

## 2025-03-13 RX ORDER — ETONOGESTREL 68 MG/1
68 IMPLANT SUBCUTANEOUS ONCE
COMMUNITY

## 2025-03-13 SDOH — ECONOMIC STABILITY: FOOD INSECURITY: WITHIN THE PAST 12 MONTHS, YOU WORRIED THAT YOUR FOOD WOULD RUN OUT BEFORE YOU GOT MONEY TO BUY MORE.: NEVER TRUE

## 2025-03-13 SDOH — ECONOMIC STABILITY: FOOD INSECURITY: WITHIN THE PAST 12 MONTHS, THE FOOD YOU BOUGHT JUST DIDN'T LAST AND YOU DIDN'T HAVE MONEY TO GET MORE.: NEVER TRUE

## 2025-03-13 ASSESSMENT — PATIENT HEALTH QUESTIONNAIRE - PHQ9
1. LITTLE INTEREST OR PLEASURE IN DOING THINGS: NOT AT ALL
SUM OF ALL RESPONSES TO PHQ QUESTIONS 1-9: 0
10. IF YOU CHECKED OFF ANY PROBLEMS, HOW DIFFICULT HAVE THESE PROBLEMS MADE IT FOR YOU TO DO YOUR WORK, TAKE CARE OF THINGS AT HOME, OR GET ALONG WITH OTHER PEOPLE: NOT DIFFICULT AT ALL
3. TROUBLE FALLING OR STAYING ASLEEP: NOT AT ALL
SUM OF ALL RESPONSES TO PHQ QUESTIONS 1-9: 0
7. TROUBLE CONCENTRATING ON THINGS, SUCH AS READING THE NEWSPAPER OR WATCHING TELEVISION: NOT AT ALL
8. MOVING OR SPEAKING SO SLOWLY THAT OTHER PEOPLE COULD HAVE NOTICED. OR THE OPPOSITE, BEING SO FIGETY OR RESTLESS THAT YOU HAVE BEEN MOVING AROUND A LOT MORE THAN USUAL: NOT AT ALL
9. THOUGHTS THAT YOU WOULD BE BETTER OFF DEAD, OR OF HURTING YOURSELF: NOT AT ALL
SUM OF ALL RESPONSES TO PHQ QUESTIONS 1-9: 0
6. FEELING BAD ABOUT YOURSELF - OR THAT YOU ARE A FAILURE OR HAVE LET YOURSELF OR YOUR FAMILY DOWN: NOT AT ALL
SUM OF ALL RESPONSES TO PHQ QUESTIONS 1-9: 0
2. FEELING DOWN, DEPRESSED OR HOPELESS: NOT AT ALL
5. POOR APPETITE OR OVEREATING: NOT AT ALL
4. FEELING TIRED OR HAVING LITTLE ENERGY: NOT AT ALL

## 2025-03-13 NOTE — PROGRESS NOTES
Kathleen Hamilton is a ,  29 y.o. female Black /  Patient's last menstrual period was 2025 (exact date). who complains of irregular menses since she had nexplanon inserted 2024. Pt reports this is her third nexplanon. Pt reports in the past she has not had any issues with nexplanon. Pt reports since she had recent nexplanon inserted at [facility] her cycles has not been regular. Pt reports she had a cycle for entire month of 10/2024, skipped November and 2024 then had a cycle every two weeks or three weeks and may spot for a day or two then restarts a new menstrual cycles. Pt reports started to have cramps with cycles which is new to her and has hx of headaches but this has worsened, she f/u with neurology which she reports she was told possibly due to nexplanon.     Her current method of family planning is Nexplanon.     She developed this problem approximately 5 month ago.     She has had vaginal bleeding which she describes as irregular cycles lasting up to several weeks.     Pad or tampon count: changes every 4 hours.      Associated symptoms include headache, irritability, and cramps .    Alleviating factors: none    Aggravating factors: none      The patient is sexually active.    Last Pap smear:was normal 2024.      Her relevant past medical history:   Past Medical History:   Diagnosis Date    Asthma     Depression     FHx: mental illness     Headache     Mental health problem     Migraine     Nicotine vapor product user         Past Surgical History:   Procedure Laterality Date    ORTHOPEDIC SURGERY       Social History     Occupational History    Not on file   Tobacco Use    Smoking status: Former     Current packs/day: 1.00     Types: Cigarettes    Smokeless tobacco: Never   Substance and Sexual Activity    Alcohol use: Yes    Drug use: No    Sexual activity: Yes     Partners: Male     Birth control/protection: Implant     Family History

## 2025-03-13 NOTE — PROGRESS NOTES
\"Have you been to the ER, urgent care clinic since your last visit?  Hospitalized since your last visit?\"    NO    “Have you seen or consulted any other health care providers outside our system since your last visit?”    NO     “Have you had a pap smear?”    NO    No cervical cancer screening on file         /80 (BP Site: Right Upper Arm, Patient Position: Sitting, BP Cuff Size: Large Adult)   Ht 1.702 m (5' 7\")   Wt 69.1 kg (152 lb 4.8 oz)   LMP 02/01/2025 (Exact Date)   BMI 23.85 kg/m²      Chief Complaint   Patient presents with    Irregular Menses     Nexplanon with October bleeding for the entire month, started period Feb. 1. This is her third Nexplanon

## 2025-03-14 LAB
ESTRADIOL SERPL-MCNC: 73.3 PG/ML
FSH SERPL-ACNC: 5.9 MIU/ML
LH SERPL-ACNC: 10.6 MIU/ML
PROLACTIN SERPL-MCNC: 11.3 NG/ML (ref 4.8–33.4)
T4 FREE SERPL-MCNC: 1.15 NG/DL (ref 0.82–1.77)
TSH SERPL DL<=0.005 MIU/L-ACNC: 3.01 UIU/ML (ref 0.45–4.5)

## 2025-03-16 LAB
A VAGINAE DNA VAG QL NAA+PROBE: ABNORMAL SCORE
BVAB2 DNA VAG QL NAA+PROBE: ABNORMAL SCORE
C ALBICANS DNA VAG QL NAA+PROBE: NEGATIVE
C GLABRATA DNA VAG QL NAA+PROBE: NEGATIVE
C KRUSEI DNA VAG QL NAA+PROBE: NEGATIVE
C LUSITANIAE DNA VAG QL NAA+PROBE: NEGATIVE
C TRACH DNA SPEC QL NAA+PROBE: NEGATIVE
CANDIDA DNA VAG QL NAA+PROBE: NEGATIVE
MEGA1 DNA VAG QL NAA+PROBE: ABNORMAL SCORE
N GONORRHOEA DNA VAG QL NAA+PROBE: NEGATIVE
T VAGINALIS DNA VAG QL NAA+PROBE: NEGATIVE

## 2025-03-19 ENCOUNTER — RESULTS FOLLOW-UP (OUTPATIENT)
Age: 30
End: 2025-03-19

## 2025-03-19 DIAGNOSIS — N76.0 BACTERIAL VAGINOSIS: Primary | ICD-10-CM

## 2025-03-19 DIAGNOSIS — D75.839 THROMBOCYTOSIS, UNSPECIFIED: ICD-10-CM

## 2025-03-19 DIAGNOSIS — B96.89 BACTERIAL VAGINOSIS: Primary | ICD-10-CM

## 2025-03-19 LAB — TESTOST FREE SERPL-MCNC: 0.6 PG/ML (ref 0–4.2)

## 2025-03-19 RX ORDER — METRONIDAZOLE 500 MG/1
500 TABLET ORAL 2 TIMES DAILY
Qty: 14 TABLET | Refills: 0 | Status: SHIPPED | OUTPATIENT
Start: 2025-03-19 | End: 2025-03-26

## 2025-03-25 ENCOUNTER — TELEPHONE (OUTPATIENT)
Age: 30
End: 2025-03-25

## 2025-03-25 NOTE — TELEPHONE ENCOUNTER
Called and left message for patient to call the office. Patient is wanting her Nexplanon removed.

## 2025-04-07 ENCOUNTER — OFFICE VISIT (OUTPATIENT)
Age: 30
End: 2025-04-07
Payer: COMMERCIAL

## 2025-04-07 VITALS — SYSTOLIC BLOOD PRESSURE: 129 MMHG | WEIGHT: 153.4 LBS | BODY MASS INDEX: 24.03 KG/M2 | DIASTOLIC BLOOD PRESSURE: 70 MMHG

## 2025-04-07 DIAGNOSIS — Z30.46 ENCOUNTER FOR REMOVAL OF SUBDERMAL CONTRACEPTIVE IMPLANT: Primary | ICD-10-CM

## 2025-04-07 DIAGNOSIS — N92.6 ABNORMAL MENSTRUAL CYCLE: ICD-10-CM

## 2025-04-07 PROCEDURE — 11982 REMOVE DRUG IMPLANT DEVICE: CPT | Performed by: NURSE PRACTITIONER

## 2025-04-07 RX ORDER — LIDOCAINE HYDROCHLORIDE AND EPINEPHRINE 10; 10 MG/ML; UG/ML
2 INJECTION, SOLUTION INFILTRATION; PERINEURAL ONCE
Status: COMPLETED | OUTPATIENT
Start: 2025-04-07 | End: 2025-04-07

## 2025-04-07 RX ORDER — MEDROXYPROGESTERONE ACETATE 150 MG/ML
150 INJECTION, SUSPENSION INTRAMUSCULAR
Qty: 1 ML | Refills: 3 | Status: SHIPPED | OUTPATIENT
Start: 2025-04-07

## 2025-04-07 RX ORDER — LIDOCAINE HYDROCHLORIDE 10 MG/ML
2 INJECTION, SOLUTION EPIDURAL; INFILTRATION; INTRACAUDAL; PERINEURAL ONCE
Status: CANCELLED | OUTPATIENT
Start: 2025-04-07 | End: 2025-04-07

## 2025-04-07 RX ADMIN — LIDOCAINE HYDROCHLORIDE AND EPINEPHRINE 2 ML: 10; 10 INJECTION, SOLUTION INFILTRATION; PERINEURAL at 14:57

## 2025-04-07 NOTE — PROGRESS NOTES
Chief Complaints:       1.  Nexplanon Removal.    HPI:         Kathleen Hamilton is a 29 y.o. female who is here for Nexplanon removal.  She would like to discuss other forms of menstrual control. Pt reports abnormal menstrual cycles and migraines has worsen. Pt wished to switch to Depo injection.       ROS:  Constitutional:  negative for fever, chills  Cardiology:       negative for chest pain, shortness of breath  Female Reproductive:  negative for pelvic pain, dyspareunia, abnormal vaginal discharge.    Objective:  Physical Examination:  GENERAL:     General: alert and oriented x 3, well developed and well nourished.  HEART: regular rate and rhythm, normal S1S2, no murmurs.  LUNGS:  clear to auscultation bilaterally, no wheezes, rhonchi/rales.  SKIN: normal, no rash or skin lesions. NEUROLOGIC EXAM:  alert and oriented x 3.    Assessment:  The primary encounter diagnosis was Encounter for removal of subdermal contraceptive implant. A diagnosis of Abnormal menstrual cycle was also pertinent to this visit.     Plan:  Stop Nexplanon implant, 68 mg, 1 ea, subcutaneously, once, 1 dose.    Discussed that a small percentage of Nexplanon users will experience spotting/irreg bleeding with or without cramping for up to 90 days; can take NSAID if implant site has pain.  Watch for infection of bleeding from implant site, notify us or go to ER if any problems.  All questions answered.    Procedures:  Nexplanon Implant Device:  Removal of Nexplanon Implant. Consent informed consent obtained, 30 second time out taken.  Understands no contraception is 100% effective. Patient placed in supine position, marked left arm, 2 swabs of alcohol, injected 1-2 cc 1% lido with epi, prepped Betadine  x 3. Incision made with 15 blade, hemostats used to grasp device, removal of subdermal device in left arm without any complications.  Post procedure: the patient tolerated procedure well, applied bandage, and pressure dressing, instructed to

## 2025-04-15 ENCOUNTER — CLINICAL SUPPORT (OUTPATIENT)
Age: 30
End: 2025-04-15
Payer: COMMERCIAL

## 2025-04-15 VITALS
WEIGHT: 151.4 LBS | RESPIRATION RATE: 18 BRPM | HEART RATE: 92 BPM | TEMPERATURE: 98.5 F | SYSTOLIC BLOOD PRESSURE: 134 MMHG | BODY MASS INDEX: 23.71 KG/M2 | DIASTOLIC BLOOD PRESSURE: 77 MMHG | OXYGEN SATURATION: 98 %

## 2025-04-15 DIAGNOSIS — N92.6 IRREGULAR MENSES: Primary | ICD-10-CM

## 2025-04-15 LAB
HCG QUALITATIVE, POC: NORMAL
HCG, PREGNANCY, URINE, POC: NEGATIVE
VALID INTERNAL CONTROL, POC: YES

## 2025-04-15 PROCEDURE — 96372 THER/PROPH/DIAG INJ SC/IM: CPT | Performed by: OBSTETRICS & GYNECOLOGY

## 2025-04-15 PROCEDURE — 84703 CHORIONIC GONADOTROPIN ASSAY: CPT | Performed by: OBSTETRICS & GYNECOLOGY

## 2025-04-15 RX ORDER — MEDROXYPROGESTERONE ACETATE 150 MG/ML
150 INJECTION, SUSPENSION INTRAMUSCULAR ONCE
Status: COMPLETED | OUTPATIENT
Start: 2025-04-15 | End: 2025-04-15

## 2025-04-15 RX ADMIN — MEDROXYPROGESTERONE ACETATE 150 MG: 150 INJECTION, SUSPENSION INTRAMUSCULAR at 11:27

## 2025-04-15 SDOH — ECONOMIC STABILITY: FOOD INSECURITY: WITHIN THE PAST 12 MONTHS, YOU WORRIED THAT YOUR FOOD WOULD RUN OUT BEFORE YOU GOT MONEY TO BUY MORE.: NEVER TRUE

## 2025-04-15 SDOH — ECONOMIC STABILITY: FOOD INSECURITY: WITHIN THE PAST 12 MONTHS, THE FOOD YOU BOUGHT JUST DIDN'T LAST AND YOU DIDN'T HAVE MONEY TO GET MORE.: NEVER TRUE

## 2025-04-15 ASSESSMENT — PATIENT HEALTH QUESTIONNAIRE - PHQ9
SUM OF ALL RESPONSES TO PHQ QUESTIONS 1-9: 1
4. FEELING TIRED OR HAVING LITTLE ENERGY: NOT AT ALL
5. POOR APPETITE OR OVEREATING: SEVERAL DAYS
10. IF YOU CHECKED OFF ANY PROBLEMS, HOW DIFFICULT HAVE THESE PROBLEMS MADE IT FOR YOU TO DO YOUR WORK, TAKE CARE OF THINGS AT HOME, OR GET ALONG WITH OTHER PEOPLE: NOT DIFFICULT AT ALL
2. FEELING DOWN, DEPRESSED OR HOPELESS: NOT AT ALL
SUM OF ALL RESPONSES TO PHQ QUESTIONS 1-9: 1
SUM OF ALL RESPONSES TO PHQ QUESTIONS 1-9: 1
1. LITTLE INTEREST OR PLEASURE IN DOING THINGS: NOT AT ALL
SUM OF ALL RESPONSES TO PHQ QUESTIONS 1-9: 1
7. TROUBLE CONCENTRATING ON THINGS, SUCH AS READING THE NEWSPAPER OR WATCHING TELEVISION: NOT AT ALL
9. THOUGHTS THAT YOU WOULD BE BETTER OFF DEAD, OR OF HURTING YOURSELF: NOT AT ALL
8. MOVING OR SPEAKING SO SLOWLY THAT OTHER PEOPLE COULD HAVE NOTICED. OR THE OPPOSITE, BEING SO FIGETY OR RESTLESS THAT YOU HAVE BEEN MOVING AROUND A LOT MORE THAN USUAL: NOT AT ALL
6. FEELING BAD ABOUT YOURSELF - OR THAT YOU ARE A FAILURE OR HAVE LET YOURSELF OR YOUR FAMILY DOWN: NOT AT ALL
3. TROUBLE FALLING OR STAYING ASLEEP: NOT AT ALL

## 2025-07-09 ENCOUNTER — CLINICAL SUPPORT (OUTPATIENT)
Age: 30
End: 2025-07-09
Payer: COMMERCIAL

## 2025-07-09 VITALS
DIASTOLIC BLOOD PRESSURE: 79 MMHG | HEART RATE: 66 BPM | BODY MASS INDEX: 24.06 KG/M2 | TEMPERATURE: 98.5 F | SYSTOLIC BLOOD PRESSURE: 127 MMHG | RESPIRATION RATE: 18 BRPM | OXYGEN SATURATION: 99 % | WEIGHT: 153.6 LBS

## 2025-07-09 DIAGNOSIS — N92.6 IRREGULAR MENSES: Primary | ICD-10-CM

## 2025-07-09 PROCEDURE — 96372 THER/PROPH/DIAG INJ SC/IM: CPT | Performed by: OBSTETRICS & GYNECOLOGY

## 2025-07-09 RX ORDER — MEDROXYPROGESTERONE ACETATE 150 MG/ML
150 INJECTION, SUSPENSION INTRAMUSCULAR ONCE
Status: COMPLETED | OUTPATIENT
Start: 2025-07-09 | End: 2025-07-09

## 2025-07-09 RX ADMIN — MEDROXYPROGESTERONE ACETATE 150 MG: 150 INJECTION, SUSPENSION INTRAMUSCULAR at 10:56

## 2025-07-09 SDOH — ECONOMIC STABILITY: FOOD INSECURITY: WITHIN THE PAST 12 MONTHS, YOU WORRIED THAT YOUR FOOD WOULD RUN OUT BEFORE YOU GOT MONEY TO BUY MORE.: NEVER TRUE

## 2025-07-09 SDOH — ECONOMIC STABILITY: FOOD INSECURITY: WITHIN THE PAST 12 MONTHS, THE FOOD YOU BOUGHT JUST DIDN'T LAST AND YOU DIDN'T HAVE MONEY TO GET MORE.: NEVER TRUE

## 2025-07-09 ASSESSMENT — PATIENT HEALTH QUESTIONNAIRE - PHQ9
SUM OF ALL RESPONSES TO PHQ QUESTIONS 1-9: 0
SUM OF ALL RESPONSES TO PHQ QUESTIONS 1-9: 0
1. LITTLE INTEREST OR PLEASURE IN DOING THINGS: NOT AT ALL
SUM OF ALL RESPONSES TO PHQ QUESTIONS 1-9: 0
2. FEELING DOWN, DEPRESSED OR HOPELESS: NOT AT ALL
SUM OF ALL RESPONSES TO PHQ QUESTIONS 1-9: 0